# Patient Record
Sex: FEMALE | Race: WHITE | NOT HISPANIC OR LATINO | Employment: OTHER | URBAN - METROPOLITAN AREA
[De-identification: names, ages, dates, MRNs, and addresses within clinical notes are randomized per-mention and may not be internally consistent; named-entity substitution may affect disease eponyms.]

---

## 2017-03-07 PROBLEM — F17.200 SMOKING ADDICTION: Status: ACTIVE | Noted: 2017-03-07

## 2017-04-19 PROBLEM — R53.83 FATIGUE: Status: ACTIVE | Noted: 2017-04-19

## 2017-05-19 PROBLEM — R40.0 SOMNOLENCE, DAYTIME: Status: ACTIVE | Noted: 2017-05-19

## 2017-07-10 PROBLEM — G89.29 CHRONIC PAIN OF RIGHT KNEE: Status: ACTIVE | Noted: 2017-07-10

## 2017-07-10 PROBLEM — M25.561 CHRONIC PAIN OF RIGHT KNEE: Status: ACTIVE | Noted: 2017-07-10

## 2017-08-24 PROBLEM — S22.42XD CLOSED FRACTURE OF MULTIPLE RIBS OF LEFT SIDE WITH ROUTINE HEALING: Status: ACTIVE | Noted: 2017-08-24

## 2017-08-24 PROBLEM — M54.32 LEFT SIDED SCIATICA: Status: ACTIVE | Noted: 2017-08-24

## 2017-08-24 PROBLEM — M25.552 LEFT HIP PAIN: Status: ACTIVE | Noted: 2017-08-24

## 2017-11-06 ENCOUNTER — TELEPHONE (OUTPATIENT)
Dept: PAIN MEDICINE | Facility: CLINIC | Age: 62
End: 2017-11-06

## 2017-11-06 ENCOUNTER — OFFICE VISIT (OUTPATIENT)
Dept: NEUROSURGERY | Facility: CLINIC | Age: 62
End: 2017-11-06
Payer: MEDICARE

## 2017-11-06 VITALS
HEART RATE: 81 BPM | DIASTOLIC BLOOD PRESSURE: 60 MMHG | SYSTOLIC BLOOD PRESSURE: 97 MMHG | BODY MASS INDEX: 26.86 KG/M2 | HEIGHT: 66 IN | WEIGHT: 167.13 LBS

## 2017-11-06 DIAGNOSIS — F20.0 PARANOID SCHIZOPHRENIA: ICD-10-CM

## 2017-11-06 DIAGNOSIS — E11.9 TYPE 2 DIABETES MELLITUS WITHOUT COMPLICATION, WITHOUT LONG-TERM CURRENT USE OF INSULIN: ICD-10-CM

## 2017-11-06 DIAGNOSIS — M25.552 LEFT HIP PAIN: ICD-10-CM

## 2017-11-06 DIAGNOSIS — E78.5 HYPERLIPIDEMIA, UNSPECIFIED HYPERLIPIDEMIA TYPE: ICD-10-CM

## 2017-11-06 DIAGNOSIS — M51.9 LUMBAR DISC DISEASE: Primary | ICD-10-CM

## 2017-11-06 DIAGNOSIS — F32.A ANXIETY AND DEPRESSION: Primary | ICD-10-CM

## 2017-11-06 DIAGNOSIS — J44.9 CHRONIC OBSTRUCTIVE PULMONARY DISEASE, UNSPECIFIED COPD TYPE: ICD-10-CM

## 2017-11-06 DIAGNOSIS — M54.32 LEFT SIDED SCIATICA: ICD-10-CM

## 2017-11-06 DIAGNOSIS — M51.27 LUMBOSACRAL DISC HERNIATION: ICD-10-CM

## 2017-11-06 DIAGNOSIS — F41.9 ANXIETY AND DEPRESSION: Primary | ICD-10-CM

## 2017-11-06 PROCEDURE — 99204 OFFICE O/P NEW MOD 45 MIN: CPT | Mod: S$GLB,,, | Performed by: NEUROLOGICAL SURGERY

## 2017-11-06 NOTE — TELEPHONE ENCOUNTER
----- Message from Theresa Cornejo sent at 11/6/2017  4:14 PM CST -----  Contact: self  Patient is needing a later time appt in the day tomorrow 11/7 Cant make the 8:00am appt wants to know if she can come later in the day     Please call [phone]

## 2017-11-06 NOTE — LETTER
November 6, 2017      Joshua Mcakey MD  [address] [address] [address]           [place] [facility]  [address] [facility] [address] [place] [address]  Phone: [phone]  Fax: [phone]          Patient: Rosenda Hunter   MR Number: [id number]   YOB: 1955   Date of Visit: 11/6/2017       Dear Dr. Joshua Mackey:    Thank you for referring Rosenda Hunter to me for evaluation. Attached you will find relevant portions of my assessment and plan of care.    If you have questions, please do not hesitate to call me. I look forward to following Rosenda Hunter along with you.    Sincerely,    Rafael Garcia MD    Enclosure  CC:  No Recipients    If you would like to receive this communication electronically, please contact [email or web address] or [phone] to request more information on [employer] Link access.    For providers and/or their staff who would like to refer a patient to [facility], please contact us through our one-stop-shop provider referral line, [facility], at [phone].    If you feel you have received this communication in error or would no longer like to receive these types of communications, please e-mail [email or web address]

## 2017-11-06 NOTE — PROGRESS NOTES
Neurosurgery History and Physical    Patient ID: Rosenda Hunter is a 62 y.o. female.    Chief Complaint   Patient presents with    Lumbar Spine Pain (L-Spine)     lower back pain is radiating to hip and through left lower extremity into ankle not into foot, pt states it gets numb and then starts tingling, pt has difficulty standing for long periods, pt states she has to walk leaned forward to relieve the pain, pt states she takes prednisone for the pain, she said she has had an injection but doesn't remember what it was, it was in the ED, no bowel or bladder issues       Review of Systems   Constitutional: Positive for activity change.   HENT: Negative.    Eyes: Negative.    Respiratory: Negative.    Cardiovascular: Negative.    Gastrointestinal: Negative.    Endocrine: Negative.    Genitourinary: Negative.    Musculoskeletal: Positive for back pain and gait problem.   Skin: Positive for rash.   Allergic/Immunologic: Negative.    Neurological: Positive for numbness. Negative for weakness.   Hematological: Negative.    Psychiatric/Behavioral: Negative.        Past Medical History:   Diagnosis Date    Anxiety     Cellulitis of leg     Depression     Fracture, rib     Hyperlipidemia     Hypothyroidism      Social History     Social History    Marital status:      Spouse name: N/A    Number of children: N/A    Years of education: N/A     Occupational History    Not on file.     Social History Main Topics    Smoking status: Current Every Day Smoker    Smokeless tobacco: Never Used    Alcohol use No    Drug use: No    Sexual activity: Not on file     Other Topics Concern    Not on file     Social History Narrative    No narrative on file     Family History   Problem Relation Age of Onset    Diabetes Mother     Hypertension Mother     Hyperlipidemia Mother     Heart disease Mother     Kidney disease Mother      Review of patient's allergies indicates:   Allergen Reactions    Natividad  [cefaclor]        Current Outpatient Prescriptions:     aripiprazole (ABILIFY) 5 MG Tab, Take 5 mg by mouth once daily., Disp: , Rfl:     cilostazol (PLETAL) 100 MG Tab, TAKE 1 TABLET BY MOUTH ONCE DAILY, Disp: 90 tablet, Rfl: 3    COMBIVENT RESPIMAT  mcg/actuation inhaler, INHALE 1 PUFF 3 TIMES DAILY, Disp: 1 Package, Rfl: 11    diazePAM (VALIUM) 5 MG tablet, TAKE 1 TABLET BY MOUTH ONCE DAILY AT BEDTIME AS NEEDED FOR SLEEP, Disp: 15 tablet, Rfl: 2    fluoxetine (PROZAC) 40 MG capsule, Take 40 mg by mouth nightly., Disp: , Rfl: 4    gemfibrozil (LOPID) 600 MG tablet, TAKE 1 TABLET BY MOUTH TWICE DAILY, Disp: 180 tablet, Rfl: 3    levothyroxine (SYNTHROID) 50 MCG tablet, TAKE 1 TABLET BY MOUTH DAILY. TAKE ON EMPTY STOMACH, Disp: 90 tablet, Rfl: 3    LYRICA 75 mg capsule, TAKE 1 CAPSULE BY MOUTH 2 TIMES DAILY, Disp: 180 capsule, Rfl: 1    mupirocin (BACTROBAN) 2 % ointment, Apply to affected area 3 times daily, Disp: 22 g, Rfl: 5    oxycodone-acetaminophen (PERCOCET) 5-325 mg per tablet, TAKE 1 TABLET BY MOUTH EVERY 6 HOURS AS NEEDED FOR RELIEF OF PAIN, Disp: , Rfl: 0    oxycodone-acetaminophen (PERCOCET) 5-325 mg per tablet, Take 1 tablet by mouth every 12 (twelve) hours as needed for Pain., Disp: 45 tablet, Rfl: 0    tizanidine (ZANAFLEX) 4 MG tablet, TAKE 1/2 TABLET BY MOUTH AT BEDTIME AS NEEDED FOR NECK PAIN, Disp: 10 tablet, Rfl: 5    tizanidine (ZANAFLEX) 4 MG tablet, Take 1 tablet (4 mg total) by mouth every 6 (six) hours as needed., Disp: 40 tablet, Rfl: 5    topiramate (TOPAMAX) 50 MG tablet, Take 1 tablet (50 mg total) by mouth 2 (two) times daily., Disp: 60 tablet, Rfl: 11    tramadol (ULTRAM) 50 mg tablet, Take 50 mg by mouth every 6 (six) hours as needed for Pain., Disp: , Rfl:     albuterol (ACCUNEB) 1.25 mg/3 mL Nebu, Take 3 mLs (1.25 mg total) by nebulization 3 (three) times daily as needed., Disp: 40 vial, Rfl: 5    diclofenac (VOLTAREN) 50 MG EC tablet, Take 1 tablet (50 mg  "total) by mouth 2 (two) times daily., Disp: 60 tablet, Rfl: 5    gabapentin (NEURONTIN) 300 MG capsule, Take 1 capsule (300 mg total) by mouth 3 (three) times daily., Disp: 90 capsule, Rfl: 11  Blood pressure 97/60, pulse 81, height 5' 6" (1.676 m), weight 75.8 kg (167 lb 1.7 oz).      Neurologic Exam     Mental Status   Oriented to person, place, and time.   Attention: normal. Concentration: normal.   Speech: speech is normal   Level of consciousness: alert  Knowledge: good.     Cranial Nerves     CN II   Visual acuity: normal    CN III, IV, VI   Pupils are equal, round, and reactive to light.  Extraocular motions are normal.     CN V   Facial sensation intact.     CN VII   Facial expression full, symmetric.     CN VIII   Hearing: intact    CN IX, X   Palate: symmetric    CN XI   CN XI normal.     CN XII   CN XII normal.     Motor Exam   Muscle bulk: normal  Overall muscle tone: normal  Right arm pronator drift: absent  Left arm pronator drift: absent    Strength   Right neck flexion: 5/5  Left neck flexion: 5/5  Right neck extension: 5/5  Left neck extension: 5/5  Right deltoid: 5/5  Left deltoid: 5/5  Right biceps: 5/5  Left biceps: 5/5  Right triceps: 5/5  Left triceps: 5/5  Right wrist flexion: 5/5  Left wrist flexion: 5/5  Right wrist extension: 5/5  Left wrist extension: 5/5  Right interossei: 5/5  Left interossei: 5/5  Right abdominals: 5/5  Left abdominals: 5/5  Right iliopsoas: 5/5  Left iliopsoas: 5/5  Right quadriceps: 5/5  Left quadriceps: 5/5  Right hamstrin/5  Left hamstrin/5  Right glutei: 5/5  Left glutei: 5/5  Right anterior tibial: 5/5  Left anterior tibial: 5/5  Right posterior tibial: 5/5  Left posterior tibial: 5/5  Right peroneal: 5/5  Left peroneal: 5/5  Right gastroc: 5/5  Left gastroc: 5/5    Sensory Exam   Light touch normal.     Gait, Coordination, and Reflexes     Gait  Gait: normal    Coordination   Romberg: negative  Finger to nose coordination: normal    Tremor   Resting " "tremor: absent    Reflexes   Right brachioradialis: 2+  Left brachioradialis: 2+  Right biceps: 2+  Left biceps: 2+  Right triceps: 2+  Left triceps: 2+  Right patellar: 2+  Left patellar: 2+  Right achilles: 1+  Left achilles: 1+  Right plantar: normal  Left plantar: normal  Right Beard: absent  Left Beard: absent  Right ankle clonus: absent  Left ankle clonus: absent      Physical Exam   Constitutional: She is oriented to person, place, and time. She appears well-developed and well-nourished.   unkept   HENT:   Head: Normocephalic and atraumatic.   Eyes: EOM are normal. Pupils are equal, round, and reactive to light.   Neck: Normal range of motion. Neck supple.   Cardiovascular: Normal rate and regular rhythm.    Pulmonary/Chest: Effort normal.   Abdominal: Soft.   Musculoskeletal: Normal range of motion.   Neurological: She is alert and oriented to person, place, and time. She has a normal Finger-Nose-Finger Test and a normal Romberg Test. Gait normal.   Reflex Scores:       Tricep reflexes are 2+ on the right side and 2+ on the left side.       Bicep reflexes are 2+ on the right side and 2+ on the left side.       Brachioradialis reflexes are 2+ on the right side and 2+ on the left side.       Patellar reflexes are 2+ on the right side and 2+ on the left side.       Achilles reflexes are 1+ on the right side and 1+ on the left side.  Skin: Skin is warm and dry.   Multiple skin sores/excoriations of various ages   Psychiatric: Her speech is normal.   Odd affect   Nursing note and vitals reviewed.      Vital Signs  Pulse: 81  BP: 97/60  Pain Score:   9 (and leg radiating from back)  Pain Loc: Hip  Height and Weight  Height: 5' 6" (167.6 cm)  Weight: 75.8 kg (167 lb 1.7 oz)  BSA (Calculated - sq m): 1.88 sq meters  BMI (Calculated): 27  Weight in (lb) to have BMI = 25: 154.6]    Provider dictation:  I reviewed the imaging. She has a left L5-S1 disc herniation with an extruded fragment in the left L5-S1 foramen " resulting in L5 nerve compression. This is most likely the source of the left leg posterior leg pain she has been having since a fall 4-5 months ago. On exam, she has no weakness. We will refer her for a left L5-S1 CELI and have her return to see Megan Melendrez after the injection. If she does not get good relief, she can see me again to disc a simple decompression.      Visit Diagnosis:  Anxiety and depression    Chronic obstructive pulmonary disease, unspecified COPD type    Hyperlipidemia, unspecified hyperlipidemia type    Paranoid schizophrenia    Type 2 diabetes mellitus without complication, without long-term current use of insulin    Left hip pain    Left sided sciatica    Lumbosacral disc herniation

## 2017-11-07 ENCOUNTER — OFFICE VISIT (OUTPATIENT)
Dept: PAIN MEDICINE | Facility: CLINIC | Age: 62
End: 2017-11-07
Payer: MEDICARE

## 2017-11-07 VITALS
WEIGHT: 167.56 LBS | DIASTOLIC BLOOD PRESSURE: 68 MMHG | RESPIRATION RATE: 20 BRPM | HEART RATE: 87 BPM | HEIGHT: 66 IN | SYSTOLIC BLOOD PRESSURE: 101 MMHG | BODY MASS INDEX: 26.93 KG/M2

## 2017-11-07 DIAGNOSIS — M51.27 LUMBOSACRAL DISC HERNIATION: Primary | ICD-10-CM

## 2017-11-07 DIAGNOSIS — M54.16 LUMBAR RADICULOPATHY: ICD-10-CM

## 2017-11-07 DIAGNOSIS — F41.9 ANXIETY AND DEPRESSION: ICD-10-CM

## 2017-11-07 DIAGNOSIS — F32.A ANXIETY AND DEPRESSION: ICD-10-CM

## 2017-11-07 DIAGNOSIS — M51.36 ANNULAR TEAR OF LUMBAR DISC: ICD-10-CM

## 2017-11-07 PROCEDURE — 99999 PR PBB SHADOW E&M-EST. PATIENT-LVL III: CPT | Mod: PBBFAC,,, | Performed by: PAIN MEDICINE

## 2017-11-07 PROCEDURE — 99204 OFFICE O/P NEW MOD 45 MIN: CPT | Mod: S$GLB,,, | Performed by: PAIN MEDICINE

## 2017-11-07 RX ORDER — DIAZEPAM 10 MG/1
TABLET ORAL
Refills: 0 | COMMUNITY
Start: 2017-10-11 | End: 2017-11-16

## 2017-11-07 RX ORDER — IBUPROFEN 800 MG/1
TABLET ORAL
Refills: 0 | COMMUNITY
Start: 2017-08-23 | End: 2018-01-31

## 2017-11-07 RX ORDER — FLUOXETINE HYDROCHLORIDE 20 MG/1
CAPSULE ORAL
Refills: 1 | COMMUNITY
Start: 2017-10-04 | End: 2021-03-09

## 2017-11-07 NOTE — LETTER
November 7, 2017      Rafael Garcia MD  [address] [facility] [address] [place] [address]           [place] [facility]  [address] [facility] [address] [place] [address]  Phone: [phone]          Patient: Rosenda Hunter   MR Number: [id number]   YOB: 1955   Date of Visit: 11/7/2017       Dear Dr. Rfaael Garcia:    Thank you for referring Rosenda Hunter to me for evaluation. Attached you will find relevant portions of my assessment and plan of care.    If you have questions, please do not hesitate to call me. I look forward to following Rosenda Hunter along with you.    Sincerely,    Robert Gaspar Jr., MD    Enclosure  CC:  No Recipients    If you would like to receive this communication electronically, please contact [email or web address] or [phone] to request more information on [employer] Link access.    For providers and/or their staff who would like to refer a patient to [facility], please contact us through our one-stop-shop provider referral line, [facility], at [phone].    If you feel you have received this communication in error or would no longer like to receive these types of communications, please e-mail [email or web address]

## 2017-11-07 NOTE — PROGRESS NOTES
[facility] New Patient Evaluation    Referred by: Dr. Gonzalez  Reason for referral: Left LBP with Radiculopathy    CC:   Chief Complaint   Patient presents with    Low-back Pain    Tailbone Pain       HPI: Rosenda Hunter is a pleasant 62 y.o. female who presented to my clinic complaining of low back pain radiating into the left leg as characterized below.  She is accompanied by her mother.    Location: Low back and left leg  Severity: Currently: 8/10   Typical Range: 4-8/10     Exacerbation: 10/10  Onset: Early summer of 2017 associated with fall  Quality: Aching, Throbbing and Shooting  Radiation: Left posterior thigh and leg, left lateral thigh and leg down to ankle.  Axial/Extremity Percentage of Pain: 30/70  Exacerbating Factors: extension and lying down  Mitigating Factors: rest  Assoc: denies night fever/night sweats, urinary incontinence, bowel incontinence, significant weight loss, significant motor weakness and loss of sensations    Previous Therapies:  PT: Denies  OT:   HEP:   TENS:  Injections: Denies  Surgery: Denies  Other:   Medications: Denies previous medications other than what is listed below.  She has been on Lyrica for quite a while.    Current Pain Medications:  1. Tramadol 50 every 6 when necessary  2. Tizanidine 2 mg daily at bedtime  3. Gabapentin 300 3 times a day  4. Lyrica 75 twice a day     Full Medication List:    Current Outpatient Prescriptions:     aripiprazole (ABILIFY) 5 MG Tab, Take 5 mg by mouth once daily., Disp: , Rfl:     cilostazol (PLETAL) 100 MG Tab, TAKE 1 TABLET BY MOUTH ONCE DAILY, Disp: 90 tablet, Rfl: 3    COMBIVENT RESPIMAT  mcg/actuation inhaler, INHALE 1 PUFF 3 TIMES DAILY, Disp: 1 Package, Rfl: 11    diazePAM (VALIUM) 10 MG Tab, Take 1 tablet (10 mg total) by mouth once., Disp: , Rfl: 0    diclofenac (VOLTAREN) 50 MG EC tablet, Take 1 tablet (50 mg total) by mouth 2 (two) times daily., Disp: 60 tablet, Rfl: 5    FLUoxetine  (PROZAC) 20 MG capsule, Take 1 capsule by mouth every night take with 40mg dose for total of 60mg, Disp: , Rfl: 1    gabapentin (NEURONTIN) 300 MG capsule, Take 1 capsule (300 mg total) by mouth 3 (three) times daily., Disp: 90 capsule, Rfl: 11    gemfibrozil (LOPID) 600 MG tablet, TAKE 1 TABLET BY MOUTH TWICE DAILY, Disp: 180 tablet, Rfl: 3    ibuprofen (ADVIL,MOTRIN) 800 MG tablet, TAKE 1 TABLET BY MOUTH EVERY 8 HOURS AS NEEDED FOR RELIEF OF PAIN, Disp: , Rfl: 0    levothyroxine (SYNTHROID) 50 MCG tablet, TAKE 1 TABLET BY MOUTH DAILY. TAKE ON EMPTY STOMACH, Disp: 90 tablet, Rfl: 3    LYRICA 75 mg capsule, TAKE 1 CAPSULE BY MOUTH 2 TIMES DAILY, Disp: 180 capsule, Rfl: 1    mupirocin (BACTROBAN) 2 % ointment, Apply to affected area 3 times daily, Disp: 22 g, Rfl: 5    oxycodone-acetaminophen (PERCOCET) 5-325 mg per tablet, Take 1 tablet by mouth every 12 (twelve) hours as needed for Pain., Disp: 45 tablet, Rfl: 0    tizanidine (ZANAFLEX) 4 MG tablet, Take 1 tablet (4 mg total) by mouth every 6 (six) hours as needed., Disp: 40 tablet, Rfl: 5    topiramate (TOPAMAX) 50 MG tablet, Take 1 tablet (50 mg total) by mouth 2 (two) times daily., Disp: 60 tablet, Rfl: 11    albuterol (ACCUNEB) 1.25 mg/3 mL Nebu, Take 3 mLs (1.25 mg total) by nebulization 3 (three) times daily as needed., Disp: 40 vial, Rfl: 5    oxycodone-acetaminophen (PERCOCET) 5-325 mg per tablet, TAKE 1 TABLET BY MOUTH EVERY 6 HOURS AS NEEDED FOR RELIEF OF PAIN, Disp: , Rfl: 0    tizanidine (ZANAFLEX) 4 MG tablet, TAKE 1/2 TABLET BY MOUTH AT BEDTIME AS NEEDED FOR NECK PAIN, Disp: 10 tablet, Rfl: 5    tramadol (ULTRAM) 50 mg tablet, Take 50 mg by mouth every 6 (six) hours as needed for Pain., Disp: , Rfl:      Review of Systems:  Review of Systems   Constitutional: Negative for chills and fever.   HENT: Negative for nosebleeds.    Eyes: Negative for pain.   Respiratory: Negative for hemoptysis.    Cardiovascular: Negative for chest pain.  "  Gastrointestinal: Negative for nausea and vomiting.   Genitourinary: Negative for dysuria.   Musculoskeletal: Positive for back pain, falls and joint pain.   Skin: Negative for rash.   Neurological: Negative for seizures.   Endo/Heme/Allergies: Bruises/bleeds easily.   Psychiatric/Behavioral: Negative for hallucinations.       Allergies:  Ceclor [cefaclor]     Medical History:  Past Medical History:   Diagnosis Date    Anxiety     Cellulitis of leg     Depression     Fracture, rib     Hyperlipidemia     Hypothyroidism         Surgical History:  Past Surgical History:   Procedure Laterality Date    DENTAL SURGERY          Social History:  Social History     Social History    Marital status:      Spouse name: N/A    Number of children: N/A    Years of education: N/A     Occupational History    Not on file.     Social History Main Topics    Smoking status: Current Every Day Smoker    Smokeless tobacco: Never Used    Alcohol use No    Drug use: No    Sexual activity: Not on file     Other Topics Concern    Not on file     Social History Narrative    No narrative on file       Physical Exam:  Vitals:    11/07/17 0824   BP: 101/68   Pulse: 87   Resp: 20   Weight: 76 kg (167 lb 8.8 oz)   Height: 5' 6" (1.676 m)   PainSc:   8   PainLoc: Back     General    Nursing note and vitals reviewed.  Constitutional: She is oriented to person, place, and time. She appears well-developed and well-nourished. No distress.   HENT:   Head: Atraumatic.   Eyes: EOM are normal. Right eye exhibits no discharge. Left eye exhibits no discharge.   Neck: No JVD present.   Cardiovascular: Intact distal pulses.    Pulmonary/Chest: Effort normal. No respiratory distress.   Abdominal: She exhibits no distension.   Neurological: She is alert and oriented to person, place, and time.   Psychiatric: Judgment and thought content normal.     General Musculoskeletal Exam   Gait: antalgic     Back (L-Spine & T-Spine) / Neck " (C-Spine) Exam     Tenderness Left paramedian tenderness of the Lower L-Spine and Upper L-Spine.     Back (L-Spine & T-Spine) Range of Motion   Lateral Bend Right: abnormal   Lateral Bend Left: abnormal   Rotation Right: abnormal   Rotation Left: abnormal     Spinal Sensation   Right Side Sensation  L-Spine Level: normal  Left Side Sensation  L-Spine Level: normal    Comments:  Straight leg raise positive on left.      Muscle Strength   Right Lower Extremity   Hip Flexion: 5/5   Quadriceps:  5/5   Hamstrin/5   Anterior tibial:  5/5/5  Gastrocsoleus:  5//5  Left Lower Extremity   Hip Flexion: 5/5   Quadriceps:  5/5   Hamstrin/5   Anterior tibial:  /5   Gastrocsoleus:  /    Vascular Exam     Right Pulses  Dorsalis Pedis:      2+  Posterior Tibial:      2+        Left Pulses  Dorsalis Pedis:      2+  Posterior Tibial:      2+            Imaging:  MRI lumbar spine 10/20/17  By review of the images in this study shows multilevel degenerative disc disease most prominent at L3-4, L4-5, and L5-S1.  There is posterior bulging of discs at these levels resulting in varying levels of neuroforaminal stenosis which is most pronounced at left L5-S1.  There is also neural foraminal stenosis to a lesser degree at left L4-5.  There appears to be a T2 hyperintensity within the disc bulge suggestive of annular tear.  There is also multilevel moderate facet disease most prominent at L4-5 and L5-S1 bilaterally.      Labs:  BMP  Lab Results   Component Value Date     2017    K 3.6 2017     2017    CO2 25 2017    BUN 18 2017    CREATININE 0.73 2017    CALCIUM 9.7 2017    ANIONGAP 8 2017    ESTGFRAFRICA >60 2017    EGFRNONAA >60 2017     Lab Results   Component Value Date    ALT 41 2017    AST 19 2017    ALKPHOS 109 2017    BILITOT 0.5 2017       Diagnoses & Associated Orders:  Problem List Items Addressed This Visit     Anxiety  and depression    Lumbosacral disc herniation - Primary    Annular tear of lumbar disc    Lumbar radiculopathy        This is a 62-year-old female with multilevel degenerative disc disease leading to symptomatic neuroforaminal stenosis at left L4-5 and L5-S1 currently on a combination of pharmacological therapies including NSAIDs, anticonvulsants, and opioids.    Recommendations & Interventions:   Procedures: Left L4-5, L5-S1 TF CELI ×1 then return to clinic for evaluation  Medications: No medication changes recommended at this time as she is already on several neuropathic medicines.  Due to her history of depression, anxiety, and paranoid schizophrenia she is a poor candidate for chronic opioid therapy.  PT/OT: I plan to refer her to physical therapy after completion of the intervention.  I do not believe she would be able to participate meaningfully at this time due to pain.  HEP: She would benefit from institution of a home exercise program based on physical therapy training which we will discuss in subsequent visits.  Follow Up: Return to clinic 2-4 weeks after TF CELI.    Robert Gaspar Jr, MD  Interventional Pain Medicine / Anesthesiology      **This note was partly generated using dictation software which may occasionally result in transcription errors.**

## 2017-11-17 DIAGNOSIS — M54.16 LUMBAR RADICULOPATHY: Primary | ICD-10-CM

## 2017-11-17 RX ORDER — ALPRAZOLAM 0.5 MG/1
1 TABLET, ORALLY DISINTEGRATING ORAL ONCE AS NEEDED
Status: CANCELLED | OUTPATIENT
Start: 2017-11-17 | End: 2017-11-17

## 2017-11-20 DIAGNOSIS — M51.36 DDD (DEGENERATIVE DISC DISEASE), LUMBAR: Primary | ICD-10-CM

## 2017-11-21 ENCOUNTER — HOSPITAL ENCOUNTER (OUTPATIENT)
Facility: HOSPITAL | Age: 62
Discharge: HOME | End: 2017-11-21
Attending: PAIN MEDICINE | Admitting: PAIN MEDICINE
Payer: MEDICARE

## 2017-11-21 ENCOUNTER — SURGERY (OUTPATIENT)
Age: 62
End: 2017-11-21

## 2017-11-21 ENCOUNTER — HOSPITAL ENCOUNTER (OUTPATIENT)
Dept: RADIOLOGY | Facility: HOSPITAL | Age: 62
Discharge: HOME | End: 2017-11-21
Attending: PAIN MEDICINE | Admitting: PAIN MEDICINE
Payer: MEDICARE

## 2017-11-21 VITALS
DIASTOLIC BLOOD PRESSURE: 59 MMHG | HEART RATE: 66 BPM | TEMPERATURE: 98 F | SYSTOLIC BLOOD PRESSURE: 116 MMHG | RESPIRATION RATE: 16 BRPM | OXYGEN SATURATION: 97 %

## 2017-11-21 DIAGNOSIS — M51.36 DDD (DEGENERATIVE DISC DISEASE), LUMBAR: ICD-10-CM

## 2017-11-21 DIAGNOSIS — M54.16 LUMBAR RADICULOPATHY: Primary | ICD-10-CM

## 2017-11-21 PROCEDURE — 64483 NJX AA&/STRD TFRM EPI L/S 1: CPT | Mod: LT,,, | Performed by: PAIN MEDICINE

## 2017-11-21 PROCEDURE — 25500020 PHARM REV CODE 255: Mod: PO | Performed by: PAIN MEDICINE

## 2017-11-21 PROCEDURE — 25000003 PHARM REV CODE 250: Mod: PO | Performed by: PAIN MEDICINE

## 2017-11-21 PROCEDURE — 64483 NJX AA&/STRD TFRM EPI L/S 1: CPT | Mod: PO | Performed by: PAIN MEDICINE

## 2017-11-21 PROCEDURE — 64484 NJX AA&/STRD TFRM EPI L/S EA: CPT | Mod: PO | Performed by: PAIN MEDICINE

## 2017-11-21 PROCEDURE — 76000 FLUOROSCOPY <1 HR PHYS/QHP: CPT | Mod: TC,PO

## 2017-11-21 PROCEDURE — 63600175 PHARM REV CODE 636 W HCPCS: Mod: PO | Performed by: PAIN MEDICINE

## 2017-11-21 RX ORDER — BUPIVACAINE HYDROCHLORIDE 2.5 MG/ML
INJECTION, SOLUTION EPIDURAL; INFILTRATION; INTRACAUDAL
Status: DISCONTINUED | OUTPATIENT
Start: 2017-11-21 | End: 2017-11-21 | Stop reason: HOSPADM

## 2017-11-21 RX ORDER — DEXAMETHASONE SODIUM PHOSPHATE 4 MG/ML
INJECTION, SOLUTION INTRA-ARTICULAR; INTRALESIONAL; INTRAMUSCULAR; INTRAVENOUS; SOFT TISSUE
Status: DISCONTINUED | OUTPATIENT
Start: 2017-11-21 | End: 2017-11-21 | Stop reason: HOSPADM

## 2017-11-21 RX ORDER — LIDOCAINE HYDROCHLORIDE 10 MG/ML
INJECTION, SOLUTION EPIDURAL; INFILTRATION; INTRACAUDAL; PERINEURAL
Status: DISCONTINUED | OUTPATIENT
Start: 2017-11-21 | End: 2017-11-21 | Stop reason: HOSPADM

## 2017-11-21 RX ORDER — ALPRAZOLAM 0.5 MG/1
1 TABLET, ORALLY DISINTEGRATING ORAL ONCE AS NEEDED
Status: COMPLETED | OUTPATIENT
Start: 2017-11-21 | End: 2017-11-21

## 2017-11-21 RX ADMIN — ALPRAZOLAM 1 MG: 0.5 TABLET, ORALLY DISINTEGRATING ORAL at 01:11

## 2017-11-21 RX ADMIN — LIDOCAINE HYDROCHLORIDE 5 ML: 10 INJECTION, SOLUTION EPIDURAL; INFILTRATION; INTRACAUDAL; PERINEURAL at 01:11

## 2017-11-21 RX ADMIN — DEXAMETHASONE SODIUM PHOSPHATE 20 MG: 4 INJECTION, SOLUTION INTRAMUSCULAR; INTRAVENOUS at 01:11

## 2017-11-21 RX ADMIN — BUPIVACAINE HYDROCHLORIDE 5 ML: 2.5 INJECTION, SOLUTION EPIDURAL; INFILTRATION; INTRACAUDAL; PERINEURAL at 01:11

## 2017-11-21 RX ADMIN — IOHEXOL 3 ML: 300 INJECTION, SOLUTION INTRAVENOUS at 01:11

## 2017-11-21 NOTE — OP NOTE
"Procedure Note    Pre-operative Diagnosis: Lumbar radiculopathy  Post-operative Diagnosis: Lumbar radiculopathy  Procedure: (1) Lumbar Transforaminal Epidural Steroid Injection and (2) Intraoperative fluoroscopy  Procedure Date: 11/21/2017        Anesthesia: Oral Sedation with Alprazolam    Indications: To alleviate pain and suffering, and reduce functional impairment associated with Lumbar radiculopathy.      The patients history and physical exam were reviewed. The risks, benefits and alternatives to the procedure were discussed, and all questions were answered to the patients satisfaction. The patient agreed to proceed, and written informed consent was verified.    Procedure in Detail: Using a fenestrated drape and Chloro-prep, the skin was prepared and draped in sterile fashion. The Left L4-5 and L5-S1 neural foramena were identified by fluoroscopy by Left lateral oblique angle. Lidocaine 1% 3 cc was used to anesthetize the skin at the skin entry point and subcutaneous tissue with 25G 1 1/2 in needle. Then a 25G 3.5" spinal needle was advanced under intermittent fluoroscopy until Kambin's triangle was entered anterolateral to the superior articular process. Fluoroscopy was then inspected from lateral and AP view and needle adjusted appropriately. There was no paresthesia with needle placement. Aspiration was negative for blood and CSF. Omnipaque contrast was injected live at each level in an AP fluoroscopic view demonstrating appropriate position with spread into the nerve root sheath and medially into the epidural space without intravascular or intrathecal spread. Next,a mixture 1 mL Bupivacaine 0.25% and 20 mg dexamethasone (total 3 mL) was divided evenly between the levels and injected slowly and incrementally. The patient tolerated the procedure without complaint and was transported to the recovery room in stable condition.     Disposition: The patient tolerated the procedure well, and there were no " apparent complications. Vital signs remained stable throughout the procedure. The patient was taken to the recovery area where written discharge instructions for the procedure were given.     Follow-up: RTC as scheduled    Robert Gaspar Jr, MD  Interventional Pain Medicine / Anesthesiology

## 2017-11-21 NOTE — DISCHARGE SUMMARY
[facility]  Discharge Note  Short Stay    Admit Date: 11/21/2017    Discharge Date and Time: No discharge date for patient encounter.     Attending Physician: Robert Gaspar Jr., MD     Discharge Provider: Robert Gaspar Jr    Diagnoses:  Active Hospital Problems    Diagnosis  POA    *Lumbar radiculopathy [M54.16]  Yes      Resolved Hospital Problems    Diagnosis Date Resolved POA   No resolved problems to display.       Discharged Condition: stable    Hospital Course: Patient was admitted for an outpatient procedure and tolerated the procedure well with no complications.    Final Diagnoses: Same as principal problem.    Disposition: Home or Self Care    Follow up/Patient Instructions:    Medications:  Reconciled Home Medications:   Current Discharge Medication List      CONTINUE these medications which have NOT CHANGED    Details   albuterol (ACCUNEB) 1.25 mg/3 mL Nebu Take 3 mLs (1.25 mg total) by nebulization 3 (three) times daily as needed.  Qty: 40 vial, Refills: 5    Comments: J44.9  Associated Diagnoses: Sinusitis, unspecified chronicity, unspecified location; Bronchitis with bronchospasm      aripiprazole (ABILIFY) 5 MG Tab Take 5 mg by mouth once daily.      cilostazol (PLETAL) 100 MG Tab TAKE 1 TABLET BY MOUTH ONCE DAILY  Qty: 90 tablet, Refills: 3    Comments: This prescription was filled today(11/7/2016). Any refills authorized will be placed on file.      COMBIVENT RESPIMAT  mcg/actuation inhaler INHALE 1 PUFF 3 TIMES DAILY  Qty: 1 Package, Refills: 11      diazePAM (VALIUM) 5 MG tablet TAKE 1 TABLET BY MOUTH ONCE DAILY AT BEDTIME AS NEEDED FOR SLEEP  Qty: 15 tablet, Refills: 2      FLUoxetine (PROZAC) 20 MG capsule Take 1 capsule by mouth every night  Refills: 1      gemfibrozil (LOPID) 600 MG tablet TAKE 1 TABLET BY MOUTH TWICE DAILY  Qty: 180 tablet, Refills: 3      ibuprofen (ADVIL,MOTRIN) 800 MG tablet TAKE 1 TABLET BY MOUTH EVERY 8 HOURS AS NEEDED FOR RELIEF OF  PAIN  Refills: 0      levothyroxine (SYNTHROID) 50 MCG tablet TAKE 1 TABLET BY MOUTH DAILY. TAKE ON EMPTY STOMACH  Qty: 90 tablet, Refills: 3      LYRICA 75 mg capsule TAKE 1 CAPSULE BY MOUTH 2 TIMES DAILY  Qty: 180 capsule, Refills: 1      mupirocin (BACTROBAN) 2 % ointment Apply to affected area 3 times daily  Qty: 22 g, Refills: 5      oxycodone-acetaminophen (PERCOCET) 5-325 mg per tablet TAKE 1 TABLET BY MOUTH EVERY 6 HOURS AS NEEDED FOR RELIEF OF PAIN  Refills: 0      !! tizanidine (ZANAFLEX) 4 MG tablet TAKE 1/2 TABLET BY MOUTH AT BEDTIME AS NEEDED FOR NECK PAIN  Qty: 10 tablet, Refills: 5      !! tizanidine (ZANAFLEX) 4 MG tablet Take 1 tablet (4 mg total) by mouth every 6 (six) hours as needed.  Qty: 40 tablet, Refills: 5      topiramate (TOPAMAX) 50 MG tablet Take 1 tablet (50 mg total) by mouth 2 (two) times daily.  Qty: 60 tablet, Refills: 11      tramadol (ULTRAM) 50 mg tablet Take 50 mg by mouth every 6 (six) hours as needed for Pain.       !! - Potential duplicate medications found. Please discuss with provider.          Discharge Procedure Orders  Diet general     Activity as tolerated     Call MD for:  temperature >100.4     Call MD for:  severe uncontrolled pain     Call MD for:  redness, tenderness, or signs of infection (pain, swelling, redness, odor or green/yellow discharge around incision site)     Call MD for:  difficulty breathing or increased cough     Call MD for:  severe persistent headache     Call MD for:  worsening rash     Remove dressing in 24 hours           Discharge Procedure Orders (must include Diet, Follow-up, Activity):    Discharge Procedure Orders (must include Diet, Follow-up, Activity)  Diet general     Activity as tolerated     Call MD for:  temperature >100.4     Call MD for:  severe uncontrolled pain     Call MD for:  redness, tenderness, or signs of infection (pain, swelling, redness, odor or green/yellow discharge around incision site)     Call MD for:  difficulty  breathing or increased cough     Call MD for:  severe persistent headache     Call MD for:  worsening rash     Remove dressing in 24 hours

## 2017-12-19 ENCOUNTER — OFFICE VISIT (OUTPATIENT)
Dept: PAIN MEDICINE | Facility: CLINIC | Age: 62
End: 2017-12-19
Payer: MEDICARE

## 2017-12-19 VITALS
WEIGHT: 171.94 LBS | DIASTOLIC BLOOD PRESSURE: 58 MMHG | BODY MASS INDEX: 27.63 KG/M2 | SYSTOLIC BLOOD PRESSURE: 108 MMHG | HEART RATE: 80 BPM | RESPIRATION RATE: 20 BRPM | TEMPERATURE: 97 F | HEIGHT: 66 IN

## 2017-12-19 DIAGNOSIS — M51.27 LUMBOSACRAL DISC HERNIATION: Primary | ICD-10-CM

## 2017-12-19 DIAGNOSIS — M47.816 LUMBAR SPONDYLOSIS: ICD-10-CM

## 2017-12-19 DIAGNOSIS — M54.16 LUMBAR RADICULOPATHY: ICD-10-CM

## 2017-12-19 DIAGNOSIS — M51.36 ANNULAR TEAR OF LUMBAR DISC: ICD-10-CM

## 2017-12-19 DIAGNOSIS — M79.18 MYOFASCIAL PAIN: ICD-10-CM

## 2017-12-19 PROCEDURE — 99214 OFFICE O/P EST MOD 30 MIN: CPT | Mod: S$GLB,,, | Performed by: PAIN MEDICINE

## 2017-12-19 PROCEDURE — 99999 PR PBB SHADOW E&M-EST. PATIENT-LVL III: CPT | Mod: PBBFAC,,, | Performed by: PAIN MEDICINE

## 2017-12-19 NOTE — PROGRESS NOTES
[facility] New Patient Evaluation    Referred by: Dr. Gonzalez  Reason for referral: Left LBP with Radiculopathy    CC:   Chief Complaint   Patient presents with    Low-back Pain     Interval Update:  #1 - 12/19/17 Overall she is doing a lot better with much less pain and limitation in function.  She can stand and walking longer without pain down the legs.  Her pain is rated 2/10 and non-radiating; not worse than 3/10 in the last week; no need for pain medications since the injection.     Background: Rosenda Hunter is a pleasant 62 y.o. female who presented to my clinic complaining of low back pain radiating into the left leg as characterized below.  She is accompanied by her mother.    Location: Low back and left leg  Severity: Currently: 8/10   Typical Range: 4-8/10     Exacerbation: 10/10  Onset: Early summer of 2017 associated with fall  Quality: Aching, Throbbing and Shooting  Radiation: Left posterior thigh and leg, left lateral thigh and leg down to ankle.  Axial/Extremity Percentage of Pain: 30/70  Exacerbating Factors: extension and lying down  Mitigating Factors: rest  Assoc: denies night fever/night sweats, urinary incontinence, bowel incontinence, significant weight loss, significant motor weakness and loss of sensations    Previous Therapies:  PT: Denies  OT:   HEP:   TENS:  Injections: Denies  Surgery: Denies  Other:   Medications: Denies previous medications other than what is listed below.  She has been on Lyrica for quite a while.    Current Pain Medications:  1. Tramadol 50 every 6 when necessary  2. Tizanidine 2 mg daily at bedtime  3. Gabapentin 300 3 times a day  4. Lyrica 75 twice a day     Full Medication List:    Current Outpatient Prescriptions:     albuterol (ACCUNEB) 1.25 mg/3 mL Nebu, Take 3 mLs (1.25 mg total) by nebulization 3 (three) times daily as needed., Disp: 40 vial, Rfl: 5    aripiprazole (ABILIFY) 5 MG Tab, Take 5 mg by mouth once daily., Disp: , Rfl:      cilostazol (PLETAL) 100 MG Tab, TAKE 1 TABLET BY MOUTH ONCE DAILY, Disp: 90 tablet, Rfl: 3    COMBIVENT RESPIMAT  mcg/actuation inhaler, INHALE 1 PUFF 3 TIMES DAILY, Disp: 1 Package, Rfl: 11    FLUoxetine (PROZAC) 20 MG capsule, Take 1 capsule by mouth every night, Disp: , Rfl: 1    gemfibrozil (LOPID) 600 MG tablet, TAKE 1 TABLET BY MOUTH TWICE DAILY, Disp: 180 tablet, Rfl: 3    levothyroxine (SYNTHROID) 50 MCG tablet, TAKE 1 TABLET BY MOUTH DAILY. TAKE ON EMPTY STOMACH, Disp: 90 tablet, Rfl: 3    LYRICA 75 mg capsule, TAKE 1 CAPSULE BY MOUTH 2 TIMES DAILY (Patient taking differently: TAKE 2 CAPSULE BY MOUTH NIGHTLY), Disp: 180 capsule, Rfl: 1    topiramate (TOPAMAX) 50 MG tablet, Take 1 tablet (50 mg total) by mouth 2 (two) times daily. (Patient taking differently: Take 100 mg by mouth 2 (two) times daily. ), Disp: 60 tablet, Rfl: 11    diazePAM (VALIUM) 5 MG tablet, TAKE 1 TABLET BY MOUTH ONCE DAILY AT BEDTIME AS NEEDED FOR SLEEP, Disp: 15 tablet, Rfl: 2    ibuprofen (ADVIL,MOTRIN) 800 MG tablet, TAKE 1 TABLET BY MOUTH EVERY 8 HOURS AS NEEDED FOR RELIEF OF PAIN, Disp: , Rfl: 0    mupirocin (BACTROBAN) 2 % ointment, Apply to affected area 3 times daily (Patient taking differently: Apply to affected area 3 times daily as needed), Disp: 22 g, Rfl: 5    oxycodone-acetaminophen (PERCOCET) 5-325 mg per tablet, TAKE 1 TABLET BY MOUTH EVERY 6 HOURS AS NEEDED FOR RELIEF OF PAIN, Disp: , Rfl: 0    tizanidine (ZANAFLEX) 4 MG tablet, TAKE 1/2 TABLET BY MOUTH AT BEDTIME AS NEEDED FOR NECK PAIN, Disp: 10 tablet, Rfl: 5    tizanidine (ZANAFLEX) 4 MG tablet, Take 1 tablet (4 mg total) by mouth every 6 (six) hours as needed., Disp: 40 tablet, Rfl: 5    tramadol (ULTRAM) 50 mg tablet, Take 50 mg by mouth every 6 (six) hours as needed for Pain., Disp: , Rfl:      Review of Systems:  Review of Systems   Constitutional: Negative for chills and fever.   HENT: Negative for nosebleeds.    Eyes: Negative for pain.  "  Respiratory: Negative for hemoptysis.    Cardiovascular: Negative for chest pain.   Gastrointestinal: Negative for nausea and vomiting.   Genitourinary: Negative for dysuria.   Musculoskeletal: Positive for back pain, falls and joint pain.   Skin: Negative for rash.   Neurological: Negative for seizures.   Endo/Heme/Allergies: Bruises/bleeds easily.   Psychiatric/Behavioral: Negative for hallucinations.       Allergies:  Ceclor [cefaclor]     Medical History:  Past Medical History:   Diagnosis Date    Anxiety     Arthritis     Cellulitis of leg     COPD (chronic obstructive pulmonary disease)     Depression     Diabetes mellitus     as an adult, corrected with diet    DVT (deep venous thrombosis)     Fracture, rib     Hyperlipidemia     Hypertension     Hypothyroidism     Lumbar disc disease         Surgical History:  Past Surgical History:   Procedure Laterality Date    DENTAL SURGERY      hien          Social History:  Social History     Social History    Marital status:      Spouse name: N/A    Number of children: N/A    Years of education: N/A     Occupational History    Not on file.     Social History Main Topics    Smoking status: Current Every Day Smoker     Packs/day: 1.00    Smokeless tobacco: Never Used    Alcohol use No    Drug use: No    Sexual activity: Yes     Partners: Male     Other Topics Concern    Not on file     Social History Narrative    No narrative on file       Physical Exam:  Vitals:    12/19/17 1006   BP: (!) 108/58   Pulse: 80   Resp: 20   Temp: 97.3 °F (36.3 °C)   Weight: 78 kg (171 lb 15.3 oz)   Height: 5' 6" (1.676 m)   PainSc:   3   PainLoc: Back     General    Nursing note and vitals reviewed.  Constitutional: She is oriented to person, place, and time. She appears well-developed and well-nourished. No distress.   HENT:   Head: Atraumatic.   Eyes: EOM are normal. Right eye exhibits no discharge. Left eye exhibits no discharge.   Neck: No JVD present. "   Cardiovascular: Intact distal pulses.    Pulmonary/Chest: Effort normal. No respiratory distress.   Abdominal: She exhibits no distension.   Neurological: She is alert and oriented to person, place, and time.   Psychiatric: Judgment and thought content normal.     General Musculoskeletal Exam   Gait: antalgic     Back (L-Spine & T-Spine) / Neck (C-Spine) Exam     Tenderness Left paramedian tenderness of the Lower L-Spine and Upper L-Spine.     Back (L-Spine & T-Spine) Range of Motion   Lateral Bend Right: abnormal   Lateral Bend Left: abnormal   Rotation Right: abnormal   Rotation Left: abnormal     Spinal Sensation   Right Side Sensation  L-Spine Level: normal  Left Side Sensation  L-Spine Level: normal    Comments:  Straight leg raise positive on left.      Muscle Strength   Right Lower Extremity   Hip Flexion: 5/5   Quadriceps:  5/5   Hamstrin/5   Anterior tibial:  5/5/5  Gastrocsoleus:  5/5/5  Left Lower Extremity   Hip Flexion: 5/5   Quadriceps:  5/5   Hamstrin/5   Anterior tibial:  5/5/5   Gastrocsoleus:  5/5/5    Vascular Exam     Right Pulses  Dorsalis Pedis:      2+  Posterior Tibial:      2+        Left Pulses  Dorsalis Pedis:      2+  Posterior Tibial:      2+            Imaging:  MRI lumbar spine 10/20/17  By review of the images in this study shows multilevel degenerative disc disease most prominent at L3-4, L4-5, and L5-S1.  There is posterior bulging of discs at these levels resulting in varying levels of neuroforaminal stenosis which is most pronounced at left L5-S1.  There is also neural foraminal stenosis to a lesser degree at left L4-5.  There appears to be a T2 hyperintensity within the disc bulge suggestive of annular tear.  There is also multilevel moderate facet disease most prominent at L4-5 and L5-S1 bilaterally.      Labs:  BMP  Lab Results   Component Value Date     2017    K 3.6 2017     2017    CO2 25 2017    BUN 18 2017    CREATININE  0.73 08/24/2017    CALCIUM 9.7 08/24/2017    ANIONGAP 8 08/24/2017    ESTGFRAFRICA >60 08/24/2017    EGFRNONAA >60 08/24/2017     Lab Results   Component Value Date    ALT 41 08/24/2017    AST 19 08/24/2017    ALKPHOS 109 08/24/2017    BILITOT 0.5 08/24/2017       Diagnoses & Associated Orders:  Problem List Items Addressed This Visit     Lumbosacral disc herniation - Primary    Relevant Orders    Ambulatory Referral to Physical/Occupational Therapy    Annular tear of lumbar disc    Relevant Orders    Ambulatory Referral to Physical/Occupational Therapy    Lumbar radiculopathy    Relevant Orders    Ambulatory Referral to Physical/Occupational Therapy    Myofascial pain    Lumbar spondylosis        This is a 62-year-old female with multilevel degenerative disc disease leading to symptomatic neuroforaminal stenosis at left L4-5 and L5-S1 with excellent response to TFESI now with persistent axial low back pain likely a combination of myofascial dysfunction and facet arthropathy.  She notes reduced usage of pain meds and muscle relaxants after the TFESI.    Recommendations & Interventions:   Procedures: None at this time but may repeat the Left L4-5, L5-S1 TF CELI ×1 in the future if needed.  May consider facet interventions after PT.  Medications:    - Due to her history of depression, anxiety, and paranoid schizophrenia she is a poor candidate for chronic opioid therapy.   - Cont current medications as needed  PT/OT: [facility]erred to [facility] on 12/19/17  HEP: I recommended she start HEP based on PT training  Follow Up: Return to clinic 8-12 weeks for follow up after PT    Robert Gaspar Jr, MD  Interventional Pain Medicine / Anesthesiology      **This note was partly generated using dictation software which may occasionally result in transcription errors.**

## 2018-01-08 ENCOUNTER — TELEPHONE (OUTPATIENT)
Dept: PAIN MEDICINE | Facility: CLINIC | Age: 63
End: 2018-01-08

## 2018-01-08 NOTE — TELEPHONE ENCOUNTER
----- Message from Baylee Gross MA sent at 1/8/2018  3:17 PM CST -----  [facility][phone], ext. [phone]      ----- Message -----  From: Ap Whitlock  Sent: 1/8/2018   2:30 PM  To: Chasity Jones [facility] Staff    Margaux called to get clinical notes and order for physical therapy for the patient.    Please call and advise.

## 2018-01-08 NOTE — TELEPHONE ENCOUNTER
----- Message from Emmettbo Emma sent at 1/8/2018  4:14 PM CST -----  Contact: Margaux - [employer]  States that she is calling back to see if the patient is getting Physical Therapy ordered and what is it you all need to discuss.  Can you please call Margaux, with [employer], back at [phone] ext [phone].  Thank you

## 2018-01-09 ENCOUNTER — TELEPHONE (OUTPATIENT)
Dept: PAIN MEDICINE | Facility: CLINIC | Age: 63
End: 2018-01-09

## 2018-01-09 DIAGNOSIS — M54.16 LUMBAR RADICULOPATHY: Primary | ICD-10-CM

## 2018-01-09 DIAGNOSIS — M51.36 ANNULAR TEAR OF LUMBAR DISC: ICD-10-CM

## 2018-01-09 DIAGNOSIS — M47.816 LUMBAR SPONDYLOSIS: ICD-10-CM

## 2018-01-09 NOTE — TELEPHONE ENCOUNTER
Spoke with [employer] [employer] and they stated [facility] cannot go to [facility] for PT as it is not covered. Patient will need to go to either [facility] or [facility] in [place]. Please place order for one of these. Thanks.

## 2018-01-09 NOTE — TELEPHONE ENCOUNTER
I will place a new PT order for [facility] in [place].  Please inform the patient of the change let her know that it was due to insurance coverage.    Robert Gaspar Jr, MD  Interventional Pain Medicine / Anesthesiology

## 2018-01-10 ENCOUNTER — TELEPHONE (OUTPATIENT)
Dept: NEUROSURGERY | Facility: CLINIC | Age: 63
End: 2018-01-10

## 2018-01-10 NOTE — TELEPHONE ENCOUNTER
Pt called back and stated that she went to the doctor just before Jordan and that doctor ordered PT for her. Dr. Gaspar's office had ordered therapy. Pt is requesting that it be sent to [facility] in [place]. Please let pt know when this has been completed. Thank you.

## 2018-01-10 NOTE — TELEPHONE ENCOUNTER
----- Message from Tanika Padgett sent at 1/10/2018 10:42 AM CST -----  Patient states that office was supposed to place an order for physical therapy. She is returning Staci's call. Please call back with details at [phone].

## 2018-01-10 NOTE — TELEPHONE ENCOUNTER
According to Dr. Garcia's notes, he referred pt to pain management, not physical therapy. I attempted to call pt and inform of this information, however, was unable to reach pt or leave message.

## 2018-01-11 NOTE — TELEPHONE ENCOUNTER
Left patient a message on this matter. She cannot go to [facility] per the [employer] representative. She is going to go to [facility] in [place], the insurance is getting it approved.

## 2018-01-12 NOTE — TELEPHONE ENCOUNTER
Spoke with pt and informed of the physical therapy matter. Pt verbalized understanding. Gave pt address to [facility] in [place].

## 2018-01-22 ENCOUNTER — OFFICE VISIT (OUTPATIENT)
Dept: NEUROSURGERY | Facility: CLINIC | Age: 63
End: 2018-01-22
Payer: MEDICARE

## 2018-01-22 VITALS
WEIGHT: 167.25 LBS | HEART RATE: 89 BPM | HEIGHT: 66 IN | BODY MASS INDEX: 26.88 KG/M2 | SYSTOLIC BLOOD PRESSURE: 88 MMHG | RESPIRATION RATE: 17 BRPM | DIASTOLIC BLOOD PRESSURE: 56 MMHG

## 2018-01-22 DIAGNOSIS — M54.17 LUMBOSACRAL RADICULOPATHY AT L5: Primary | ICD-10-CM

## 2018-01-22 PROCEDURE — 99999 PR PBB SHADOW E&M-EST. PATIENT-LVL IV: CPT | Mod: PBBFAC,,, | Performed by: PHYSICIAN ASSISTANT

## 2018-01-22 PROCEDURE — 99213 OFFICE O/P EST LOW 20 MIN: CPT | Mod: S$GLB,,, | Performed by: PHYSICIAN ASSISTANT

## 2018-01-22 RX ORDER — FLUOXETINE HYDROCHLORIDE 40 MG/1
CAPSULE ORAL
Refills: 3 | COMMUNITY
Start: 2017-12-18 | End: 2021-03-09

## 2018-01-23 NOTE — PROGRESS NOTES
Neurosurgery History and Physical    Patient ID: Rosenda Hunter is a 62 y.o. female.    Chief Complaint   Patient presents with    Lumbar Spine Pain (L-Spine)     lower back pain is radiating to hip and through left lower extremity, has started PT and feels a little relief, CELI in November that gave her relief for about a month  and the pain came back.        Review of Systems   Constitutional: Positive for activity change.   HENT: Negative.    Eyes: Negative.    Respiratory: Negative.    Cardiovascular: Negative.    Gastrointestinal: Negative.    Endocrine: Negative.    Genitourinary: Negative.    Musculoskeletal: Positive for back pain and gait problem.   Skin: Positive for rash.   Allergic/Immunologic: Negative.    Neurological: Positive for numbness. Negative for weakness.   Hematological: Negative.    Psychiatric/Behavioral: Negative.        Past Medical History:   Diagnosis Date    Anxiety     Arthritis     Cellulitis of leg     COPD (chronic obstructive pulmonary disease)     Depression     Diabetes mellitus     as an adult, corrected with diet    DVT (deep venous thrombosis)     Fracture, rib     Hyperlipidemia     Hypertension     Hypothyroidism     Lumbar disc disease      Social History     Social History    Marital status:      Spouse name: N/A    Number of children: N/A    Years of education: N/A     Occupational History    Not on file.     Social History Main Topics    Smoking status: Current Every Day Smoker     Packs/day: 1.00    Smokeless tobacco: Never Used    Alcohol use No    Drug use: No    Sexual activity: Yes     Partners: Male     Other Topics Concern    Not on file     Social History Narrative    No narrative on file     Family History   Problem Relation Age of Onset    Diabetes Mother     Hypertension Mother     Hyperlipidemia Mother     Heart disease Mother     Kidney disease Mother      Review of patient's allergies indicates:   Allergen  Reactions    Ceclor [cefaclor]        Current Outpatient Prescriptions:     albuterol (ACCUNEB) 1.25 mg/3 mL Nebu, Take 3 mLs (1.25 mg total) by nebulization 3 (three) times daily as needed., Disp: 40 vial, Rfl: 5    aripiprazole (ABILIFY) 5 MG Tab, Take 5 mg by mouth once daily., Disp: , Rfl:     cilostazol (PLETAL) 100 MG Tab, TAKE 1 TABLET BY MOUTH ONCE DAILY, Disp: 90 tablet, Rfl: 3    diazePAM (VALIUM) 5 MG tablet, TAKE 1 TABLET BY MOUTH ONCE DAILY AT BEDTIME AS NEEDED FOR SLEEP, Disp: 15 tablet, Rfl: 2    FLUoxetine (PROZAC) 20 MG capsule, Take 1 capsule by mouth every night, Disp: , Rfl: 1    FLUoxetine (PROZAC) 40 MG capsule, Take 1 capsule by mouth every night take with 20mg dose for total of 60mg, Disp: , Rfl: 3    gemfibrozil (LOPID) 600 MG tablet, TAKE 1 TABLET BY MOUTH TWICE DAILY, Disp: 180 tablet, Rfl: 3    ibuprofen (ADVIL,MOTRIN) 800 MG tablet, TAKE 1 TABLET BY MOUTH EVERY 8 HOURS AS NEEDED FOR RELIEF OF PAIN, Disp: , Rfl: 0    levothyroxine (SYNTHROID) 50 MCG tablet, TAKE 1 TABLET BY MOUTH DAILY. TAKE ON EMPTY STOMACH, Disp: 90 tablet, Rfl: 3    LYRICA 75 mg capsule, TAKE 1 CAPSULE BY MOUTH 2 TIMES DAILY (Patient taking differently: TAKE 2 CAPSULE BY MOUTH NIGHTLY), Disp: 180 capsule, Rfl: 1    topiramate (TOPAMAX) 50 MG tablet, Take 1 tablet (50 mg total) by mouth 2 (two) times daily. (Patient taking differently: Take 100 mg by mouth 2 (two) times daily. ), Disp: 60 tablet, Rfl: 11    COMBIVENT RESPIMAT  mcg/actuation inhaler, INHALE 1 PUFF 3 TIMES DAILY, Disp: 1 Package, Rfl: 11    mupirocin (BACTROBAN) 2 % ointment, Apply to affected area 3 times daily (Patient taking differently: Apply to affected area 3 times daily as needed), Disp: 22 g, Rfl: 5    oxycodone-acetaminophen (PERCOCET) 5-325 mg per tablet, TAKE 1 TABLET BY MOUTH EVERY 6 HOURS AS NEEDED FOR RELIEF OF PAIN, Disp: , Rfl: 0    tizanidine (ZANAFLEX) 4 MG tablet, TAKE 1/2 TABLET BY MOUTH AT BEDTIME AS NEEDED FOR  "NECK PAIN, Disp: 10 tablet, Rfl: 5    tramadol (ULTRAM) 50 mg tablet, Take 50 mg by mouth every 6 (six) hours as needed for Pain., Disp: , Rfl:   Blood pressure (!) 88/56, pulse 89, resp. rate 17, height 5' 6" (1.676 m), weight 75.8 kg (167 lb 3.5 oz).      Neurologic Exam     Mental Status   Oriented to person, place, and time.   Attention: normal. Concentration: normal.   Speech: speech is normal   Level of consciousness: alert  Knowledge: good.     Cranial Nerves     CN II   Visual acuity: normal    CN III, IV, VI   Pupils are equal, round, and reactive to light.  Extraocular motions are normal.     CN V   Facial sensation intact.     CN VII   Facial expression full, symmetric.     CN VIII   Hearing: intact    CN IX, X   Palate: symmetric    CN XI   CN XI normal.     CN XII   CN XII normal.     Motor Exam   Muscle bulk: normal  Overall muscle tone: normal  Right arm pronator drift: absent  Left arm pronator drift: absent    Strength   Right neck flexion: 5/5  Left neck flexion: 5/5  Right neck extension: 5/5  Left neck extension: 5/5  Right deltoid: 5/5  Left deltoid: 5/5  Right biceps: 5/5  Left biceps: 5/5  Right triceps: 5/5  Left triceps: 5/5  Right wrist flexion: 5/5  Left wrist flexion: 5/5  Right wrist extension: 5/5  Left wrist extension: 5/5  Right interossei: 5/5  Left interossei: 5/5  Right abdominals: 5/5  Left abdominals: 5/5  Right iliopsoas: 5/5  Left iliopsoas: 5/5  Right quadriceps: 5/5  Left quadriceps: 5/5  Right hamstrin/5  Left hamstrin/5  Right glutei: 5/5  Left glutei: 5/5  Right anterior tibial: 5/5  Left anterior tibial: 5/5  Right posterior tibial: 5/5  Left posterior tibial: 5/5  Right peroneal: 5/5  Left peroneal: 5/5  Right gastroc: 5/5  Left gastroc: 5/5    Sensory Exam   Light touch normal.     Gait, Coordination, and Reflexes     Gait  Gait: normal    Coordination   Romberg: negative  Finger to nose coordination: normal    Tremor   Resting tremor: absent    Reflexes " "  Right brachioradialis: 2+  Left brachioradialis: 2+  Right biceps: 2+  Left biceps: 2+  Right triceps: 2+  Left triceps: 2+  Right patellar: 2+  Left patellar: 2+  Right achilles: 1+  Left achilles: 1+  Right plantar: normal  Left plantar: normal  Right Beard: absent  Left Beard: absent  Right ankle clonus: absent  Left ankle clonus: absent      Physical Exam   Constitutional: She is oriented to person, place, and time. She appears well-developed and well-nourished.   unkept   HENT:   Head: Normocephalic and atraumatic.   Eyes: EOM are normal. Pupils are equal, round, and reactive to light.   Neck: Normal range of motion. Neck supple.   Cardiovascular: Normal rate and regular rhythm.    Pulmonary/Chest: Effort normal.   Abdominal: Soft.   Musculoskeletal: Normal range of motion.   Neurological: She is alert and oriented to person, place, and time. She has a normal Finger-Nose-Finger Test and a normal Romberg Test. Gait normal.   Reflex Scores:       Tricep reflexes are 2+ on the right side and 2+ on the left side.       Bicep reflexes are 2+ on the right side and 2+ on the left side.       Brachioradialis reflexes are 2+ on the right side and 2+ on the left side.       Patellar reflexes are 2+ on the right side and 2+ on the left side.       Achilles reflexes are 1+ on the right side and 1+ on the left side.  Skin: Skin is warm and dry.   Multiple skin sores/excoriations of various ages   Psychiatric: Her speech is normal.   Odd affect   Nursing note and vitals reviewed.      Vital Signs  Pulse: 89  Resp: 17  BP: (!) 88/56  Pain Score:   6  Pain Loc: Back  Height and Weight  Height: 5' 6" (167.6 cm)  Weight: 75.8 kg (167 lb 3.5 oz)  BSA (Calculated - sq m): 1.88 sq meters  BMI (Calculated): 27  Weight in (lb) to have BMI = 25: 154.6]    Provider dictation:  Ms. Rosenda ALCARAZ AmarjitKeila presents today with her mother for follow up after CELI. She reports that the injection significantly helped her and that her pair " "only occurs when she does heavy duty house work every 2 weeks. The left leg pain has almost completely resolved except for intermittent flair ups.  She feels that her pain is "not bad enough for surgery." She no longer uses a walker to walk. She only has back pain with sweeping and mopping. She has also been using a TENS unit which is helping. She had one session of PT and is interested in continuing. Her  recently passed away in October 2017 and she recently moved in with her mother.     On MRI, she has a left L5-S1 disc herniation with an extruded fragment in the left L5-S1 foramen resulting in L5 nerve compression.     Physical exam is intact other than multiple skin wounds.     At this time, the patient is having infrequent back and left leg pain. She would like to hold off on any surgical intervention, but would like to follow up with me in 3-4 months to recheck. She will continue PT and Pain management for injections.     Visit Diagnosis:  There are no diagnoses linked to this encounter.  "

## 2018-01-30 ENCOUNTER — TELEPHONE (OUTPATIENT)
Dept: PAIN MEDICINE | Facility: CLINIC | Age: 63
End: 2018-01-30

## 2018-01-30 ENCOUNTER — TELEPHONE (OUTPATIENT)
Dept: NEUROSURGERY | Facility: CLINIC | Age: 63
End: 2018-01-30

## 2018-01-30 NOTE — TELEPHONE ENCOUNTER
----- Message from Rachael Glez sent at 1/30/2018  4:13 PM CST -----  Returning your call.  Please call [phone].

## 2018-01-30 NOTE — TELEPHONE ENCOUNTER
Patient fell sat night and is now having pain in her left knee and back. Made her an appointment to come in tomorrow to see Dr Gaspar as she is asking for something for the pain.

## 2018-01-30 NOTE — TELEPHONE ENCOUNTER
----- Message from Jocelyn Gregory sent at 1/30/2018  9:50 AM CST -----  Contact: self  Patient called stated she fell on 01/27. She reports her knees hit first. She states she's experiencing neck, back and left leg pain. Patient asking if Dr. Garcia would like for her to continue therapy. Patient call back [phone].

## 2018-01-31 ENCOUNTER — OFFICE VISIT (OUTPATIENT)
Dept: PAIN MEDICINE | Facility: CLINIC | Age: 63
End: 2018-01-31
Payer: MEDICARE

## 2018-01-31 VITALS
WEIGHT: 172.63 LBS | TEMPERATURE: 97 F | HEART RATE: 80 BPM | SYSTOLIC BLOOD PRESSURE: 102 MMHG | BODY MASS INDEX: 27.74 KG/M2 | RESPIRATION RATE: 20 BRPM | HEIGHT: 66 IN | DIASTOLIC BLOOD PRESSURE: 59 MMHG
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
## 2018-01-31 DIAGNOSIS — M25.512 ACUTE PAIN OF LEFT SHOULDER: ICD-10-CM

## 2018-01-31 DIAGNOSIS — M25.562 ACUTE PAIN OF LEFT KNEE: Primary | ICD-10-CM

## 2018-01-31 DIAGNOSIS — M25.552 ACUTE PAIN OF LEFT HIP: ICD-10-CM
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
## 2018-01-31 PROCEDURE — 99999 PR PBB SHADOW E&M-EST. PATIENT-LVL III: CPT | Mod: PBBFAC,,, | Performed by: PAIN MEDICINE

## 2018-01-31 PROCEDURE — 99214 OFFICE O/P EST MOD 30 MIN: CPT | Mod: S$GLB,,, | Performed by: PAIN MEDICINE

## 2018-01-31 PROCEDURE — 3008F BODY MASS INDEX DOCD: CPT | Mod: S$GLB,,, | Performed by: PAIN MEDICINE

## 2018-01-31 RX ORDER — HYDROCODONE BITARTRATE AND ACETAMINOPHEN 5; 325 MG/1; MG/1
1 TABLET ORAL EVERY 8 HOURS PRN
Qty: 30 TABLET | Refills: 0 | Status: SHIPPED | OUTPATIENT
Start: 2018-01-31 | End: 2018-02-15

## 2018-01-31 RX ORDER — MELOXICAM 15 MG/1
15 TABLET ORAL DAILY
Qty: 15 TABLET | Refills: 0 | Status: SHIPPED | OUTPATIENT
Start: 2018-01-31 | End: 2018-02-15

## 2018-01-31 NOTE — PROGRESS NOTES
[facility] New Patient Evaluation    Referred by: Dr. Gonzalez  Reason for referral: Left LBP with Radiculopathy    CC:   Chief Complaint   Patient presents with    Low-back Pain    Neck Pain    Knee Pain     LEFT    Hip Pain     LEFT     Interval Update:  18 - She returns today repotting acute pain due to fall at home when she tripped over her exercise equipment on 18.  She reports left sided knee, hip, and shoulder pain rated at a 7/10.  She took one of her  's Hydromorphone 2 mg tablets this morning for pain relief.  She is currently in PT as prescribed but missed the most recent appointment due to pain from the fall.  She is requesting medications for her acute pain.    17 - Overall she is doing a lot better with much less pain and limitation in function.  She can stand and walking longer without pain down the legs.  Her pain is rated 2/10 and non-radiating; not worse than 3/10 in the last week; no need for pain medications since the injection.     Background: Rosenda Hunter is a pleasant 62 y.o. female who presented to my clinic complaining of low back pain radiating into the left leg as characterized below.  She is accompanied by her mother.    Location: Low back and left leg  Severity: Currently: 8/10   Typical Range: 4-8/10     Exacerbation: 10/10  Onset: Early summer of 2017 associated with fall  Quality: Aching, Throbbing and Shooting  Radiation: Left posterior thigh and leg, left lateral thigh and leg down to ankle.  Axial/Extremity Percentage of Pain: 30/70  Exacerbating Factors: extension and lying down  Mitigating Factors: rest  Assoc: denies night fever/night sweats, urinary incontinence, bowel incontinence, significant weight loss, significant motor weakness and loss of sensations    Previous Therapies:  PT: Currently engaged for low back rehab  OT:   HEP: Endorses daily performance until fall on 18  TENS:  Injections: Denies  Surgery:  Denies  Other:   Medications: Denies previous medications other than what is listed below.  She has been on Lyrica for quite a while.    Current Pain Medications:  1. Tramadol 50 every 6 when necessary  2. Tizanidine 2 mg daily at bedtime  3. Gabapentin 300 3 times a day  4. Lyrica 75 twice a day     Full Medication List:    Current Outpatient Prescriptions:     albuterol (ACCUNEB) 1.25 mg/3 mL Nebu, Take 3 mLs (1.25 mg total) by nebulization 3 (three) times daily as needed., Disp: 40 vial, Rfl: 5    aripiprazole (ABILIFY) 5 MG Tab, Take 5 mg by mouth once daily., Disp: , Rfl:     cilostazol (PLETAL) 100 MG Tab, TAKE 1 TABLET BY MOUTH ONCE DAILY, Disp: 90 tablet, Rfl: 3    COMBIVENT RESPIMAT  mcg/actuation inhaler, INHALE 1 PUFF 3 TIMES DAILY, Disp: 1 Package, Rfl: 11    diazePAM (VALIUM) 5 MG tablet, TAKE 1 TABLET BY MOUTH ONCE DAILY AT BEDTIME AS NEEDED FOR SLEEP, Disp: 15 tablet, Rfl: 2    FLUoxetine (PROZAC) 20 MG capsule, Take 1 capsule by mouth every night, Disp: , Rfl: 1    FLUoxetine (PROZAC) 40 MG capsule, Take 1 capsule by mouth every night take with 20mg dose for total of 60mg, Disp: , Rfl: 3    gemfibrozil (LOPID) 600 MG tablet, TAKE 1 TABLET BY MOUTH TWICE DAILY, Disp: 180 tablet, Rfl: 3    levothyroxine (SYNTHROID) 50 MCG tablet, TAKE 1 TABLET BY MOUTH DAILY. TAKE ON EMPTY STOMACH, Disp: 90 tablet, Rfl: 3    LYRICA 75 mg capsule, TAKE 1 CAPSULE BY MOUTH 2 TIMES DAILY (Patient taking differently: TAKE 2 CAPSULE BY MOUTH NIGHTLY), Disp: 180 capsule, Rfl: 1    tizanidine (ZANAFLEX) 4 MG tablet, TAKE 1/2 TABLET BY MOUTH AT BEDTIME AS NEEDED FOR NECK PAIN, Disp: 10 tablet, Rfl: 5    topiramate (TOPAMAX) 50 MG tablet, Take 1 tablet (50 mg total) by mouth 2 (two) times daily. (Patient taking differently: Take 100 mg by mouth 2 (two) times daily. ), Disp: 60 tablet, Rfl: 11    ibuprofen (ADVIL,MOTRIN) 800 MG tablet, TAKE 1 TABLET BY MOUTH EVERY 8 HOURS AS NEEDED FOR RELIEF OF PAIN, Disp: ,  Rfl: 0    mupirocin (BACTROBAN) 2 % ointment, Apply to affected area 3 times daily (Patient taking differently: Apply to affected area 3 times daily as needed), Disp: 22 g, Rfl: 5    oxycodone-acetaminophen (PERCOCET) 5-325 mg per tablet, TAKE 1 TABLET BY MOUTH EVERY 6 HOURS AS NEEDED FOR RELIEF OF PAIN, Disp: , Rfl: 0    tramadol (ULTRAM) 50 mg tablet, Take 50 mg by mouth every 6 (six) hours as needed for Pain., Disp: , Rfl:      Review of Systems:  Review of Systems   Constitutional: Negative for chills and fever.   HENT: Negative for nosebleeds.    Eyes: Negative for pain.   Respiratory: Negative for hemoptysis.    Cardiovascular: Negative for chest pain.   Gastrointestinal: Negative for nausea and vomiting.   Genitourinary: Negative for dysuria.   Musculoskeletal: Positive for back pain, falls and joint pain.   Skin: Negative for rash.   Neurological: Negative for seizures.   Endo/Heme/Allergies: Bruises/bleeds easily.   Psychiatric/Behavioral: Negative for hallucinations.       Allergies:  Ceclor [cefaclor]     Medical History:  Past Medical History:   Diagnosis Date    Anxiety     Arthritis     Cellulitis of leg     COPD (chronic obstructive pulmonary disease)     Depression     Diabetes mellitus     as an adult, corrected with diet    DVT (deep venous thrombosis)     Fracture, rib     Hyperlipidemia     Hypertension     Hypothyroidism     Lumbar disc disease         Surgical History:  Past Surgical History:   Procedure Laterality Date    DENTAL SURGERY      hien          Social History:  Social History     Social History    Marital status:      Spouse name: N/A    Number of children: N/A    Years of education: N/A     Occupational History    Not on file.     Social History Main Topics    Smoking status: Current Every Day Smoker     Packs/day: 1.00    Smokeless tobacco: Never Used    Alcohol use No    Drug use: No    Sexual activity: Yes     Partners: Male     Other Topics  "Concern    Not on file     Social History Narrative    No narrative on file       Physical Exam:  Vitals:    18 1003   BP: (!) 102/59   Pulse: 80   Resp: 20   Temp: 96.9 °F (36.1 °C)   TempSrc: Oral   Weight: 78.3 kg (172 lb 9.9 oz)   Height: 5' 6" (1.676 m)   PainSc:   7   PainLoc: Back     General    Nursing note and vitals reviewed.  Constitutional: She is oriented to person, place, and time. She appears well-developed and well-nourished. No distress.   HENT:   Head: Atraumatic.   Eyes: EOM are normal. Right eye exhibits no discharge. Left eye exhibits no discharge.   Neck: No JVD present.   Cardiovascular: Intact distal pulses.    Pulmonary/Chest: Effort normal. No respiratory distress.   Abdominal: She exhibits no distension.   Neurological: She is alert and oriented to person, place, and time.   Psychiatric: Judgment and thought content normal.     General Musculoskeletal Exam   Gait: antalgic     Back (L-Spine & T-Spine) / Neck (C-Spine) Exam     Tenderness Left paramedian tenderness of the Lower L-Spine and Upper L-Spine.     Back (L-Spine & T-Spine) Range of Motion   Lateral Bend Right: abnormal   Lateral Bend Left: abnormal   Rotation Right: abnormal   Rotation Left: abnormal     Spinal Sensation   Right Side Sensation  L-Spine Level: normal  Left Side Sensation  L-Spine Level: normal    Comments:  Straight leg raise positive on left.      Muscle Strength   Right Lower Extremity   Hip Flexion: 5/5   Quadriceps:  5/5   Hamstrin/5   Anterior tibial:  5/5/5  Gastrocsoleus:  5/5/5  Left Lower Extremity   Hip Flexion: 5/5   Quadriceps:  5/5   Hamstrin/5   Anterior tibial:  5/5/5   Gastrocsoleus:  5/5/5    Vascular Exam     Right Pulses  Dorsalis Pedis:      2+  Posterior Tibial:      2+        Left Pulses  Dorsalis Pedis:      2+  Posterior Tibial:      2+            Imaging:  MRI lumbar spine 10/20/17  By review of the images in this study shows multilevel degenerative disc disease most " prominent at L3-4, L4-5, and L5-S1.  There is posterior bulging of discs at these levels resulting in varying levels of neuroforaminal stenosis which is most pronounced at left L5-S1.  There is also neural foraminal stenosis to a lesser degree at left L4-5.  There appears to be a T2 hyperintensity within the disc bulge suggestive of annular tear.  There is also multilevel moderate facet disease most prominent at L4-5 and L5-S1 bilaterally.      Labs:  BMP  Lab Results   Component Value Date     01/22/2018    K 4.0 01/22/2018     01/22/2018    CO2 27 01/22/2018    BUN 6 (L) 01/22/2018    CREATININE 0.73 01/22/2018    CALCIUM 9.3 01/22/2018    ANIONGAP 9 01/22/2018    ESTGFRAFRICA >60 01/22/2018    EGFRNONAA >60 01/22/2018     Lab Results   Component Value Date    ALT 26 01/22/2018    AST 13 (L) 01/22/2018    ALKPHOS 85 01/22/2018    BILITOT 0.5 01/22/2018       Diagnoses & Associated Orders:  Problem List Items Addressed This Visit     Acute pain of left knee - Primary    Acute pain of left hip    Acute pain of left shoulder    Fall at home, initial encounter        This is a 62-year-old female with multilevel degenerative disc disease leading to symptomatic neuroforaminal stenosis at left L4-5 and L5-S1 with excellent response to TFESI now with persistent axial low back pain likely a combination of myofascial dysfunction and facet arthropathy.  She notes reduced usage of pain meds and muscle relaxants after the TFESI.    Recommendations & Interventions:   Procedures: None at this time but may repeat the Left L4-5, L5-S1 TF CELI ×1 in the future if needed.  May consider facet interventions after PT.  Medications:    - Due to her history of depression, anxiety, and paranoid schizophrenia she is a poor candidate for chronic opioid therapy.   - Cont current medications as needed   - Start short course of Norco 5-235 q8 #30 for acute pain   - Start Short course of Meloxicam 15 daily for acute pain   - Cont  Tizanidine 4 mg QHS for muscle relaxation   - Cont conservative measures- heating pad, warm bath  PT/OT: Cont PT with [facility] [place]  HEP: Take short break from HEP to recover from fall, then return to normal routine  Follow Up: Return to clinic 4-6 weeks for assessment after PT    Robert Gaspar Jr, MD  Interventional Pain Medicine / Anesthesiology      **This note was partly generated using dictation software which may occasionally result in transcription errors.**

## 2018-02-15 PROBLEM — E27.1 ADDISON'S DISEASE: Status: ACTIVE | Noted: 2018-02-15

## 2018-05-09 PROBLEM — R42 DIZZINESS: Status: ACTIVE | Noted: 2018-05-09

## 2018-05-09 PROBLEM — E27.1 ADDISON'S DISEASE: Status: RESOLVED | Noted: 2018-02-15 | Resolved: 2018-05-09

## 2018-05-09 PROBLEM — L73.9 FOLLICULITIS: Status: ACTIVE | Noted: 2018-05-09

## 2018-05-22 ENCOUNTER — PATIENT MESSAGE (OUTPATIENT)
Dept: NEUROSURGERY | Facility: CLINIC | Age: 63
End: 2018-05-22

## 2018-05-22 ENCOUNTER — OFFICE VISIT (OUTPATIENT)
Dept: NEUROSURGERY | Facility: CLINIC | Age: 63
End: 2018-05-22
Payer: MEDICARE

## 2018-05-22 VITALS
WEIGHT: 169.63 LBS | HEIGHT: 65 IN | HEART RATE: 60 BPM | BODY MASS INDEX: 28.26 KG/M2 | DIASTOLIC BLOOD PRESSURE: 66 MMHG | SYSTOLIC BLOOD PRESSURE: 103 MMHG

## 2018-05-22 DIAGNOSIS — M54.2 NECK PAIN: ICD-10-CM

## 2018-05-22 DIAGNOSIS — M51.26 LUMBAR HERNIATED DISC: Primary | ICD-10-CM

## 2018-05-22 PROCEDURE — 99999 PR PBB SHADOW E&M-EST. PATIENT-LVL III: CPT | Mod: PBBFAC,,, | Performed by: PHYSICIAN ASSISTANT

## 2018-05-22 PROCEDURE — 99214 OFFICE O/P EST MOD 30 MIN: CPT | Mod: S$GLB,,, | Performed by: PHYSICIAN ASSISTANT

## 2018-05-22 PROCEDURE — 3008F BODY MASS INDEX DOCD: CPT | Mod: CPTII,S$GLB,, | Performed by: PHYSICIAN ASSISTANT

## 2018-06-04 NOTE — PROGRESS NOTES
Neurosurgery History & Physical    Patient ID: Rosenda Velázquez is a 63 y.o. female.    Chief Complaint   Patient presents with    Lumbar Spine Pain (L-Spine)     pt states pain only when standing up or sitting down, goes away with exercises from PT, pt states increase in diarrhea, denies bladder issues, denies tingling and numbness, completed PT and is still doing exercises at home, CELI helped some    Cervical Spine Pain (C-spine)     pt states neck hurts on in c-spine area, difficulty turning neck from side-to-side, increase in headaches, denies numbness and tingling       Review of Systems   Constitutional: Negative for chills, diaphoresis, fatigue and fever.   HENT: Negative for congestion, ear pain, rhinorrhea, sneezing, sore throat and tinnitus.    Eyes: Negative for photophobia, pain, redness and visual disturbance.   Respiratory: Negative for cough, chest tightness, shortness of breath and wheezing.    Cardiovascular: Negative for chest pain, palpitations and leg swelling.   Gastrointestinal: Negative for abdominal distention, abdominal pain, constipation, diarrhea, nausea and vomiting.   Genitourinary: Negative for difficulty urinating, dysuria, frequency and urgency.   Musculoskeletal: Positive for neck pain. Negative for back pain, gait problem and myalgias.   Skin: Negative for pallor and rash.   Neurological: Negative for dizziness, seizures, speech difficulty, weakness, numbness and headaches.   Psychiatric/Behavioral: Negative for confusion and hallucinations.       Past Medical History:   Diagnosis Date    Anxiety     Arthritis     Cellulitis of leg     COPD (chronic obstructive pulmonary disease)     Depression     Diabetes mellitus     as an adult, corrected with diet    DVT (deep venous thrombosis)     Fracture, rib     Hyperlipidemia     Hypertension     Hypothyroidism     Lumbar disc disease      Social History     Social History    Marital status:      Spouse name:  "N/A    Number of children: N/A    Years of education: N/A     Occupational History    Not on file.     Social History Main Topics    Smoking status: Current Every Day Smoker     Packs/day: 1.00    Smokeless tobacco: Never Used    Alcohol use No    Drug use: No    Sexual activity: Yes     Partners: Male     Other Topics Concern    Not on file     Social History Narrative    No narrative on file     Family History   Problem Relation Age of Onset    Diabetes Mother     Hypertension Mother     Hyperlipidemia Mother     Heart disease Mother     Kidney disease Mother     Breast cancer Paternal Aunt      Review of patient's allergies indicates:   Allergen Reactions    Ceclor [cefaclor] Swelling       Current Outpatient Prescriptions:     albuterol (ACCUNEB) 1.25 mg/3 mL Nebu, Take 3 mLs (1.25 mg total) by nebulization 3 (three) times daily as needed., Disp: 40 vial, Rfl: 5    aripiprazole (ABILIFY) 5 MG Tab, Take 5 mg by mouth once daily., Disp: , Rfl:     COMBIVENT RESPIMAT  mcg/actuation inhaler, INHALE 1 PUFF 3 TIMES DAILY SHAKE WELL RINSE MOUTH AFTER USING, Disp: 4 g, Rfl: 5    diazePAM (VALIUM) 5 MG tablet, TAKE 1 TABLET BY MOUTH ONCE DAILY AT BEDTIME AS NEEDED FOR SLEEP, Disp: 15 tablet, Rfl: 2    FLUoxetine (PROZAC) 20 MG capsule, Take 1 capsule by mouth every night, Disp: , Rfl: 1    FLUoxetine (PROZAC) 40 MG capsule, Take 1 capsule by mouth every night take with 20mg dose for total of 60mg, Disp: , Rfl: 3    levothyroxine (SYNTHROID) 50 MCG tablet, TAKE 1 TABLET BY MOUTH DAILY. TAKE ON EMPTY STOMACH, Disp: 90 tablet, Rfl: 3    LYRICA 75 mg capsule, TAKE 1 CAPSULE BY MOUTH 2 TIMES DAILY, Disp: 180 capsule, Rfl: 1    doxycycline (ADOXA) 50 MG tablet, Take 1 tablet (50 mg total) by mouth 2 (two) times daily., Disp: 20 tablet, Rfl: 1  Blood pressure 103/66, pulse 60, height 5' 5" (1.651 m), weight 76.9 kg (169 lb 10.3 oz).      Neurologic Exam  Mental Status   Oriented to person, place, " and time.   Attention: normal. Concentration: normal.   Speech: speech is normal   Level of consciousness: alert  Knowledge: good.      Cranial Nerves      CN II   Visual acuity: normal     CN III, IV, VI   Pupils are equal, round, and reactive to light.  Extraocular motions are normal.      CN V   Facial sensation intact.      CN VII   Facial expression full, symmetric.      CN VIII   Hearing: intact     CN IX, X   Palate: symmetric     CN XI   CN XI normal.      CN XII   CN XII normal.      Motor Exam   Muscle bulk: normal  Overall muscle tone: normal  Right arm pronator drift: absent  Left arm pronator drift: absent     Strength   Right neck flexion: 5/5  Left neck flexion: 5/5  Right neck extension: 5/5  Left neck extension: 5/5  Right deltoid: 5/5  Left deltoid: 5/5  Right biceps: 5/5  Left biceps: 5/5  Right triceps: 5/5  Left triceps: 5/5  Right wrist flexion: 5/5  Left wrist flexion: 5/5  Right wrist extension: 5/5  Left wrist extension: 5/5  Right interossei: 5/5  Left interossei: 5/5  Right abdominals: 5/5  Left abdominals: 5/5  Right iliopsoas: 5/5  Left iliopsoas: 5/5  Right quadriceps: 5/5  Left quadriceps: 5/5  Right hamstrin/5  Left hamstrin/5  Right glutei: 5/5  Left glutei: 5/5  Right anterior tibial: 5/5  Left anterior tibial: 5/5  Right posterior tibial: 5/5  Left posterior tibial: 5/5  Right peroneal: 5/5  Left peroneal: 5/5  Right gastroc: 5/5  Left gastroc: 5/5     Sensory Exam   Light touch normal.      Gait, Coordination, and Reflexes      Gait  Gait: normal     Coordination   Romberg: negative  Finger to nose coordination: normal     Tremor   Resting tremor: absent     Reflexes   Right brachioradialis: 2+  Left brachioradialis: 2+  Right biceps: 2+  Left biceps: 2+  Right triceps: 2+  Left triceps: 2+  Right patellar: 2+  Left patellar: 2+  Right achilles: 1+  Left achilles: 1+  Right plantar: normal  Left plantar: normal  Right Baerd: absent  Left Beard: absent  Right ankle  "clonus: absent  Left ankle clonus: absent     Physical Exam  Constitutional: She is oriented to person, place, and time. She appears well-developed and well-nourished.   unkept   HENT:   Head: Normocephalic and atraumatic.   Eyes: EOM are normal. Pupils are equal, round, and reactive to light.   Neck: Normal range of motion. Neck supple.   Cardiovascular: Normal rate and regular rhythm.    Pulmonary/Chest: Effort normal.   Abdominal: Soft.   Musculoskeletal: Normal range of motion.   + mild pain with cervical ROM  Denies tenderness to cervical spine palpation  Neurological: She is alert and oriented to person, place, and time. She has a normal Finger-Nose-Finger Test and a normal Romberg Test. Gait normal.   Reflex Scores:       Tricep reflexes are 2+ on the right side and 2+ on the left side.       Bicep reflexes are 2+ on the right side and 2+ on the left side.       Brachioradialis reflexes are 2+ on the right side and 2+ on the left side.       Patellar reflexes are 2+ on the right side and 2+ on the left side.       Achilles reflexes are 1+ on the right side and 1+ on the left side.  Skin: Skin is warm and dry.   Multiple skin sores/excoriations of various ages   Psychiatric: Her speech is normal.   Odd affect   Nursing note and vitals reviewed.      Provider dictation:    Ms. Rosenda OcasioErickMelania presents today with her mother for follow up after CELI/PT. Patient was last seen by Nicole Gaspar PA-C on 1/22/2018. She reports that the injection significantly helped her and that her pain only occurs when she does heavy duty house work every 2 weeks. She underwent a left L4-5, L5-S1 transforaminal CELI with Dr. Gaspar on 11/21/2017. The left leg pain has almost completely resolved except for intermittent flair ups.  She feels that her pain is "not bad enough for surgery." She no longer uses a walker to walk. She only has back pain with sweeping and mopping. She has also been using a TENS unit which is " helping. She continues to participate in PT and reports improvement. Her  recently passed away in October 2017 and she recently moved in with her mother. She does report a fall on 1/27/2018 after tripping over come exercise equipment at home. She reports intermittent neck pain with ROM since the fall. Denies upper extremity pain or numbness. Denies upper extremity weakness.     On MRI, she has a left L5-S1 disc herniation with an extruded fragment in the left L5-S1 foramen resulting in L5 nerve compression. No imaging of cervical spine to review.      Physical exam is intact other than multiple skin wounds and mild neck pain with ROM.    Back and leg pain has resolved with conservative treatment. I would like to obtain a CT cervical spine to rule out fracture following fall. I will call her with the results. I do not anticipate any surgical intervention in the near future. She can continue PT and follow-up with Dr. Gaspar. She was informed to call the clinic with any further questions or concerns.     1. Lumbar herniated disc     2. Neck pain  CT Cervical Spine Without Contrast

## 2018-12-17 LAB — NONINV COLON CA DNA+OCC BLD SCRN STL QL: POSITIVE

## 2019-03-25 PROBLEM — M50.90 CERVICAL DISC DISEASE: Status: ACTIVE | Noted: 2019-03-25

## 2019-04-01 ENCOUNTER — TELEPHONE (OUTPATIENT)
Dept: NEUROSURGERY | Facility: CLINIC | Age: 64
End: 2019-04-01

## 2019-04-01 NOTE — TELEPHONE ENCOUNTER
----- Message from Heather Montenegro sent at 4/1/2019  8:20 AM CDT -----  Contact: self  Type:  Sooner Apoointment Request    Caller is requesting a sooner appointment.  Caller declined first available appointment listed below.  Caller will not accept being placed on the waitlist and is requesting a message be sent to doctor.  Name of Caller:pt   When is the first available appointment? N/a  Symptoms: Cervical disc disease  Would the patient rather a call back or a response via [facility]? Call back  Best Call Back Number: [phone]  Additional Information: Pt would like an appt asap

## 2019-04-02 ENCOUNTER — OFFICE VISIT (OUTPATIENT)
Dept: NEUROSURGERY | Facility: CLINIC | Age: 64
End: 2019-04-02
Payer: MEDICARE

## 2019-04-02 ENCOUNTER — OFFICE VISIT (OUTPATIENT)
Dept: PAIN MEDICINE | Facility: CLINIC | Age: 64
End: 2019-04-02
Payer: MEDICARE

## 2019-04-02 VITALS
BODY MASS INDEX: 26.08 KG/M2 | WEIGHT: 162.25 LBS | SYSTOLIC BLOOD PRESSURE: 100 MMHG | HEIGHT: 66 IN | DIASTOLIC BLOOD PRESSURE: 60 MMHG

## 2019-04-02 VITALS
RESPIRATION RATE: 20 BRPM | TEMPERATURE: 97 F | OXYGEN SATURATION: 96 % | DIASTOLIC BLOOD PRESSURE: 59 MMHG | WEIGHT: 160.94 LBS | SYSTOLIC BLOOD PRESSURE: 101 MMHG | HEART RATE: 67 BPM | BODY MASS INDEX: 25.98 KG/M2

## 2019-04-02 DIAGNOSIS — M47.812 SPONDYLOSIS OF CERVICAL REGION WITHOUT MYELOPATHY OR RADICULOPATHY: Primary | ICD-10-CM

## 2019-04-02 DIAGNOSIS — M54.2 NECK PAIN: Primary | ICD-10-CM

## 2019-04-02 DIAGNOSIS — M50.90 CERVICAL DISC DISEASE: Primary | ICD-10-CM

## 2019-04-02 DIAGNOSIS — G56.03 BILATERAL CARPAL TUNNEL SYNDROME: ICD-10-CM

## 2019-04-02 DIAGNOSIS — M54.2 NECK PAIN: ICD-10-CM

## 2019-04-02 PROCEDURE — 99999 PR PBB SHADOW E&M-EST. PATIENT-LVL II: CPT | Mod: PBBFAC,,, | Performed by: NEUROLOGICAL SURGERY

## 2019-04-02 PROCEDURE — 99214 OFFICE O/P EST MOD 30 MIN: CPT | Mod: S$GLB,,, | Performed by: ANESTHESIOLOGY

## 2019-04-02 PROCEDURE — 99214 PR OFFICE/OUTPT VISIT, EST, LEVL IV, 30-39 MIN: ICD-10-PCS | Mod: S$GLB,,, | Performed by: NEUROLOGICAL SURGERY

## 2019-04-02 PROCEDURE — 3008F BODY MASS INDEX DOCD: CPT | Mod: CPTII,S$GLB,, | Performed by: ANESTHESIOLOGY

## 2019-04-02 PROCEDURE — 3008F PR BODY MASS INDEX (BMI) DOCUMENTED: ICD-10-PCS | Mod: CPTII,S$GLB,, | Performed by: NEUROLOGICAL SURGERY

## 2019-04-02 PROCEDURE — 99999 PR PBB SHADOW E&M-EST. PATIENT-LVL III: CPT | Mod: PBBFAC,,, | Performed by: ANESTHESIOLOGY

## 2019-04-02 PROCEDURE — 3008F BODY MASS INDEX DOCD: CPT | Mod: CPTII,S$GLB,, | Performed by: NEUROLOGICAL SURGERY

## 2019-04-02 PROCEDURE — 99999 PR PBB SHADOW E&M-EST. PATIENT-LVL III: ICD-10-PCS | Mod: PBBFAC,,, | Performed by: ANESTHESIOLOGY

## 2019-04-02 PROCEDURE — 99214 PR OFFICE/OUTPT VISIT, EST, LEVL IV, 30-39 MIN: ICD-10-PCS | Mod: S$GLB,,, | Performed by: ANESTHESIOLOGY

## 2019-04-02 PROCEDURE — 99214 OFFICE O/P EST MOD 30 MIN: CPT | Mod: S$GLB,,, | Performed by: NEUROLOGICAL SURGERY

## 2019-04-02 PROCEDURE — 99999 PR PBB SHADOW E&M-EST. PATIENT-LVL II: ICD-10-PCS | Mod: PBBFAC,,, | Performed by: NEUROLOGICAL SURGERY

## 2019-04-02 PROCEDURE — 3008F PR BODY MASS INDEX (BMI) DOCUMENTED: ICD-10-PCS | Mod: CPTII,S$GLB,, | Performed by: ANESTHESIOLOGY

## 2019-04-02 RX ORDER — HYDROCODONE BITARTRATE AND ACETAMINOPHEN 5; 325 MG/1; MG/1
1 TABLET ORAL EVERY 12 HOURS PRN
Qty: 14 TABLET | Refills: 0 | Status: SHIPPED | OUTPATIENT
Start: 2019-04-02 | End: 2019-10-08

## 2019-04-02 NOTE — PROGRESS NOTES
[facility] Follow Up Evaluation    Referred by: Dr. Garcia  Reason for referral: neck pain    CC:   Chief Complaint   Patient presents with    Neck Pain      Last 3 PDI Scores 4/2/2019 1/31/2018 12/19/2017   Pain Disability Index (PDI) 5 44 16       HPI:   Rosenda Hunter is a 64 y.o. female who complains of neck pain    Onset: a couple months  Progression: since onset, pain is stable  Current Pain Score: 8/10  Typical Range: 6-8/10  Timing: constant  Quality: Aching and Dull  Radiation: no  Associated numbness or weakness: yes numbness, tingling right hand  Exacerbated by: nothing in particular  Allievated by: nothing  Is Pain Level Acceptable?: No    Previous Therapies:  PT/OT: yes in the past for the lumbar spine  HEP:   Interventions:   Previous:  11/21/17 -  Left L4-5, L5-S1 TFESI  Surgery:  Medications:   - NSAIDS:   - MSK Relaxants:   - TCAs:   - SNRIs:   - Topicals:   - Anticonvulsants:  - Opioids: hydrocodone     Current Pain Medications:  1. Lyrica 75mg BID    History:    Current Outpatient Medications:     albuterol (ACCUNEB) 1.25 mg/3 mL Nebu, Take 3 mLs (1.25 mg total) by nebulization 3 (three) times daily as needed., Disp: 75 mL, Rfl: 5    aripiprazole (ABILIFY) 5 MG Tab, Take 5 mg by mouth once daily., Disp: , Rfl:     clindamycin (CLEOCIN HCL) 150 MG capsule, Take 1 capsule (150 mg total) by mouth 4 (four) times daily. for 10 days, Disp: 40 capsule, Rfl: 3    COMBIVENT RESPIMAT  mcg/actuation inhaler, INHALE 1 PUFF 3 TIMES DAILY SHAKE WELL RINSE MOUTH AFTER USING, Disp: 4 g, Rfl: 5    cyanocobalamin, vitamin B-12, 1,000 mcg/15 mL Liqd, Take by mouth once daily., Disp: , Rfl:     diazePAM (VALIUM) 5 MG tablet, TAKE 1 TABLET BY MOUTH ONCE DAILY AT BEDTIME AS NEEDED FOR SLEEP, Disp: 15 tablet, Rfl: 2    FLUoxetine (PROZAC) 20 MG capsule, Take 1 capsule by mouth every night, Disp: , Rfl: 1    FLUoxetine (PROZAC) 40 MG capsule, Take 1 capsule by mouth every night take  with 20mg dose for total of 60mg, Disp: , Rfl: 3    HYDROcodone-acetaminophen (NORCO) 5-325 mg per tablet, Take 1 tablet by mouth every 12 (twelve) hours as needed for Pain., Disp: 14 tablet, Rfl: 0    levothyroxine (SYNTHROID) 50 MCG tablet, TAKE 1 TABLET BY MOUTH DAILY. TAKE ON EMPTY STOMACH, Disp: 90 tablet, Rfl: 3    LYRICA 75 mg capsule, TAKE 1 CAPSULE BY MOUTH 2 TIMES DAILY, Disp: 180 capsule, Rfl: 1    Past Medical History:   Diagnosis Date    Anxiety     Arthritis     Cellulitis of leg     COPD (chronic obstructive pulmonary disease)     Depression     Diabetes mellitus     as an adult, corrected with diet    DVT (deep venous thrombosis)     Fracture, rib     Hyperlipidemia     Hypertension     Hypothyroidism     Lumbar disc disease        Past Surgical History:   Procedure Laterality Date    DENTAL SURGERY      hien      HIEN-TRANSFORAMINAL L4/5 and L5/S1 Left 11/21/2017    Performed by Robert Gaspar Jr[facility] OR       Family History   Problem Relation Age of Onset    Diabetes Mother     Hypertension Mother     Hyperlipidemia Mother     Heart disease Mother     Kidney disease Mother     Breast cancer Paternal Aunt        Social History     Socioeconomic History    Marital status:      Spouse name: Not on file    Number of children: Not on file    Years of education: Not on file    Highest education level: Not on file   Occupational History    Not on file   Social Needs    Financial resource strain: Not on file    Food insecurity:     Worry: Not on file     Inability: Not on file    Transportation needs:     Medical: Not on file     Non-medical: Not on file   Tobacco Use    Smoking status: Current Every Day Smoker     Packs/day: 1.00    Smokeless tobacco: Never Used   Substance and Sexual Activity    Alcohol use: No    Drug use: No    Sexual activity: Yes     Partners: Male   Lifestyle    Physical activity:     Days per week: Not on file     Minutes per  session: Not on file    Stress: Not on file   Relationships    Social connections:     Talks on phone: Not on file     Gets together: Not on file     Attends Judaism service: Not on file     Active member of club or organization: Not on file     Attends meetings of clubs or organizations: Not on file     Relationship status: Not on file   Other Topics Concern    Not on file   Social History Narrative    Not on file       Review of patient's allergies indicates:   Allergen Reactions    Ceclor [cefaclor] Swelling       Review of Systems:  There have been no changes since the patient was last seen by Dr. Gaspar in the office on 1/31/18 except for - dermatologic + lesions over her hands and neck    Physical Exam:  Vitals:    04/02/19 1418   BP: (!) 101/59   Pulse: 67   Resp: 20   Temp: 96.5 °F (35.8 °C)   TempSrc: Oral   SpO2: 96%   Weight: 73 kg (160 lb 15 oz)   PainSc:   8   PainLoc: Neck     Body mass index is 25.98 kg/m².    Imaging:  MRI cervical 2/19/19  FINDINGS:  There is loss of normal lordotic curvature with mild kyphosis centered at the C6 vertebral body level.  There is mild levo scoliotic curvature centered at the C5 vertebral body level.  There is 3 mm anterolisthesis of C4 on C5.  No other malalignment.  Mild degenerative endplate edema is present at C7-T1.  Otherwise, bone marrow signal throughout the cervical spine is within normal limits.  Craniocervical junction appears normal.  Unremarkable paraspinal soft tissues.    C2-C3: No central canal or foraminal narrowing.    C3-C4: 3 mm disc bulge with mild bilateral hypertrophic facet changes.  Mild to moderate central canal stenosis, worst at the right lateral recess.  Moderate to severe right neural foraminal narrowing.  Moderate left neural foraminal narrowing.    C4-C5: Minimal anterolisthesis of C4 and C5 with slight uncovering of the intervertebral disc.  There is a superimposed 1-2 mm broad-based disc bulge, asymmetrically larger at the  left paramedian position.  Mild central canal narrowing.  Mild bilateral neural foraminal narrowing.    C5-C6: No significant disc bulge.  Moderately sized left and small right uncovertebral osteophytes.  The left uncovertebral osteophyte results in moderate narrowing at the left lateral recess.  There is mild local mass effect on the subjacent cervical cord.  No abnormal cord signal.  No other central canal narrowing.  Mild to moderate right neural foraminal narrowing is present.  There is severe left neural foraminal narrowing.    C6-C7: Tiny right and moderately sized left uncovertebral osteophytes.  No significant disc bulge.  The left-sided uncovertebral osteophyte results in moderate central canal narrowing, worse at the left lateral recess.  There is mild local mass effect on the subjacent cord.  No abnormal cord signal.  Mild to moderate right neural foraminal narrowing.  Severe left neural foraminal narrowing.    C7-T1: 4 mm disc bulge.  Small bilateral uncovertebral osteophytes.  Mild central canal narrowing.  No right foraminal narrowing.  Mild left neural foraminal narrowing.    Labs:  BMP  Lab Results   Component Value Date     09/06/2018    K 4.2 09/06/2018     09/06/2018    CO2 30 09/06/2018    BUN 14 09/06/2018    CREATININE 0.72 09/06/2018    CALCIUM 9.4 09/06/2018    ANIONGAP 5 (L) 09/06/2018    ESTGFRAFRICA >60 09/06/2018    EGFRNONAA >60 09/06/2018     Lab Results   Component Value Date    ALT 23 09/06/2018    AST 15 09/06/2018    ALKPHOS 81 09/06/2018    BILITOT 0.3 09/06/2018       Assessment:  Problem List Items Addressed This Visit        Neuro    Spondylosis of cervical region without myelopathy or radiculopathy - Primary    Relevant Orders    Ambulatory Referral to Physical/Occupational Therapy    Bilateral carpal tunnel syndrome          Treatment Plan:  64 y.o. year old female with PMH, COPD, DM II, hypothyroidism, presents to the office with neck pain.  Her neck pain has been  bothering her for the past couple of months.  She denies any prior neck surgery or injections.  Today she c/o neck pain, 8/10, constant, dull and aching.  No radicular pain or radiculopathy.  She also has numbness intermittently in both of her hands that get worse when she crochets.  She has not tried any PT for her neck pain.  She takes motrin with mild relief.  Has previously take hydrocodone 5/325mg prn with mild-moderate relief for severe pain.  On exam 5/5 strength b/l UE's.  No signs of myelopathy.  + Phalen's b/l.  Referral given for physical therapy to improve function and strength, and to receive training towards establishing a safe and effective home exercise program (HEP).  She should try to wear carpal tunnel brace in the evenings.  If still continues to have numbness in her hands, can refer to orthopedics for evaluation.  She should continue NSAIDs and I will provide with short course of hydrocodone for prn use for severe pain.  I also discussed this should not be taken with valium, and educated her on the risks of using benzo's and opioids such as respiratory depression, oversedation, and even death.  She understands and will not take the medications at the same time.  In the future I advised her that she can refill as needed with her primary care doctor as she has previously done.  If her axial neck pain fails to improve with conservative treatment, discussed that I can consider further interventions.  However it should be noted that she has multiple open lesions over her neck and shoulders, and I discussed that I am unwilling to do any intervention until they are completely healed.  Follow up in the office in 8 weeks following PT.    Procedures: none at this time  PT/OT/HEP: Referral given for physical therapy to improve function and strength, and to receive training towards establishing a safe and effective home exercise program (HEP).   Medications: copntinue motrin prn, short course hydrocodone  5/325mg po bid prn for severe pain  Labs: Reviewed and medications are appropriately dosed for current hepatorenal function.  Imaging: No additional recommended at this time.    : Reviewed and consistent with medication use as prescribed.    Yasmany Sotomayor M.D.  Interventional Pain Medicine / Anesthesiology

## 2019-04-02 NOTE — PROGRESS NOTES
Neurosurgery History and Physical    Patient ID: Rosenda Hunter is a 64 y.o. female.    Chief Complaint   Patient presents with    Neck Pain     onset 1/2019, pain starts in mid neck and radiates down back or up into head, tingling down into right hand, looses balance, No accident, Oswestry = 38, PHQ = 14       Review of Systems   Constitutional: Negative.    HENT: Negative.    Eyes: Negative.    Respiratory: Negative.    Cardiovascular: Negative.    Gastrointestinal: Negative.    Endocrine: Negative.    Genitourinary: Negative.    Musculoskeletal: Positive for back pain and neck pain.   Skin: Negative.    Allergic/Immunologic: Negative.    Neurological: Positive for numbness.   Hematological: Negative.    Psychiatric/Behavioral: Negative.        Past Medical History:   Diagnosis Date    Anxiety     Arthritis     Cellulitis of leg     COPD (chronic obstructive pulmonary disease)     Depression     Diabetes mellitus     as an adult, corrected with diet    DVT (deep venous thrombosis)     Fracture, rib     Hyperlipidemia     Hypertension     Hypothyroidism     Lumbar disc disease      Social History     Socioeconomic History    Marital status:      Spouse name: Not on file    Number of children: Not on file    Years of education: Not on file    Highest education level: Not on file   Occupational History    Not on file   Social Needs    Financial resource strain: Not on file    Food insecurity:     Worry: Not on file     Inability: Not on file    Transportation needs:     Medical: Not on file     Non-medical: Not on file   Tobacco Use    Smoking status: Current Every Day Smoker     Packs/day: 1.00    Smokeless tobacco: Never Used   Substance and Sexual Activity    Alcohol use: No    Drug use: No    Sexual activity: Yes     Partners: Male   Lifestyle    Physical activity:     Days per week: Not on file     Minutes per session: Not on file    Stress: Not on file   Relationships     Social connections:     Talks on phone: Not on file     Gets together: Not on file     Attends Anabaptist service: Not on file     Active member of club or organization: Not on file     Attends meetings of clubs or organizations: Not on file     Relationship status: Not on file    Intimate partner violence:     Fear of current or ex partner: Not on file     Emotionally abused: Not on file     Physically abused: Not on file     Forced sexual activity: Not on file   Other Topics Concern    Not on file   Social History Narrative    Not on file     Family History   Problem Relation Age of Onset    Diabetes Mother     Hypertension Mother     Hyperlipidemia Mother     Heart disease Mother     Kidney disease Mother     Breast cancer Paternal Aunt      Review of patient's allergies indicates:   Allergen Reactions    Ceclor [cefaclor] Swelling       Current Outpatient Medications:     albuterol (ACCUNEB) 1.25 mg/3 mL Nebu, Take 3 mLs (1.25 mg total) by nebulization 3 (three) times daily as needed., Disp: 75 mL, Rfl: 5    aripiprazole (ABILIFY) 5 MG Tab, Take 5 mg by mouth once daily., Disp: , Rfl:     baclofen (LIORESAL) 10 MG tablet, Take 1 tablet (10 mg total) by mouth 3 (three) times daily., Disp: 40 tablet, Rfl: 1    clindamycin (CLEOCIN HCL) 150 MG capsule, Take 1 capsule (150 mg total) by mouth 4 (four) times daily. for 10 days, Disp: 40 capsule, Rfl: 3    COMBIVENT RESPIMAT  mcg/actuation inhaler, INHALE 1 PUFF 3 TIMES DAILY SHAKE WELL RINSE MOUTH AFTER USING, Disp: 4 g, Rfl: 5    cyanocobalamin, vitamin B-12, 1,000 mcg/15 mL Liqd, Take by mouth once daily., Disp: , Rfl:     diazePAM (VALIUM) 5 MG tablet, TAKE 1 TABLET BY MOUTH ONCE DAILY AT BEDTIME AS NEEDED FOR SLEEP, Disp: 15 tablet, Rfl: 2    FLUoxetine (PROZAC) 20 MG capsule, Take 1 capsule by mouth every night, Disp: , Rfl: 1    FLUoxetine (PROZAC) 40 MG capsule, Take 1 capsule by mouth every night take with 20mg dose for total of  60mg, Disp: , Rfl: 3    HYDROcodone-acetaminophen (NORCO) 5-325 mg per tablet, Take 1 tablet by mouth every 6 (six) hours as needed for Pain., Disp: 25 tablet, Rfl: 0    levothyroxine (SYNTHROID) 50 MCG tablet, TAKE 1 TABLET BY MOUTH DAILY. TAKE ON EMPTY STOMACH, Disp: 90 tablet, Rfl: 3    LYRICA 75 mg capsule, TAKE 1 CAPSULE BY MOUTH 2 TIMES DAILY, Disp: 180 capsule, Rfl: 1  There were no vitals taken for this visit.      Neurologic Exam     Mental Status   Oriented to person, place, and time.   Attention: normal. Concentration: normal.   Speech: speech is normal   Level of consciousness: alert  Knowledge: good.     Cranial Nerves     CN II   Visual acuity: normal    CN III, IV, VI   Pupils are equal, round, and reactive to light.  Extraocular motions are normal.     CN V   Facial sensation intact.     CN VII   Facial expression full, symmetric.     CN VIII   Hearing: intact    CN IX, X   Palate: symmetric    CN XI   CN XI normal.     CN XII   CN XII normal.     Motor Exam   Muscle bulk: normal  Overall muscle tone: normal  Right arm pronator drift: absent  Left arm pronator drift: absent    Strength   Right deltoid: 5/5  Left deltoid: 5/5  Right biceps: 5/5  Left biceps: 5/5  Right triceps: 5/5  Left triceps: 5/5  Right wrist flexion: 5/5  Left wrist flexion: 5/5  Right wrist extension: 5/5  Left wrist extension: 5/5  Right interossei: 5/5  Left interossei: 5/5  Right iliopsoas: 5/5  Left iliopsoas: 5/5  Right quadriceps: 5/5  Left quadriceps: 5/5  Right hamstrin/5  Left hamstrin/5  Right anterior tibial: 5/5  Left anterior tibial: 5/5  Right posterior tibial: 5/5  Left posterior tibial: 5/5  Right peroneal: 5/5  Left peroneal: 5/5  Right gastroc: 5/5  Left gastroc: 5/5    Sensory Exam   Light touch normal.     Gait, Coordination, and Reflexes     Gait  Gait: normal    Coordination   Romberg: negative  Finger to nose coordination: normal    Tremor   Resting tremor: absent    Reflexes   Right  brachioradialis: 2+  Left brachioradialis: 2+  Right biceps: 2+  Left biceps: 2+  Right triceps: 2+  Left triceps: 2+  Right patellar: 2+  Left patellar: 2+  Right achilles: 1+  Left achilles: 1+  Right plantar: normal  Left plantar: normal  Right Beard: absent  Left Beard: absent  Right ankle clonus: absent  Left ankle clonus: absent      Physical Exam   Constitutional: She is oriented to person, place, and time.   Appears older than stated age. Unkept. Poor hygiene.   HENT:   Head: Normocephalic and atraumatic.   Eyes: Pupils are equal, round, and reactive to light. EOM are normal.   Neck: Normal range of motion. Neck supple.   Cardiovascular: Normal rate and regular rhythm.   Pulmonary/Chest: Effort normal.   Abdominal: Soft.   Musculoskeletal: Normal range of motion.   Neurological: She is alert and oriented to person, place, and time. She has a normal Finger-Nose-Finger Test and a normal Romberg Test. Gait normal.   Reflex Scores:       Tricep reflexes are 2+ on the right side and 2+ on the left side.       Bicep reflexes are 2+ on the right side and 2+ on the left side.       Brachioradialis reflexes are 2+ on the right side and 2+ on the left side.       Patellar reflexes are 2+ on the right side and 2+ on the left side.       Achilles reflexes are 1+ on the right side and 1+ on the left side.  Skin: Skin is warm and dry.   Multiple ulcerated lesions in various stages of healing throughout exposed skin   Psychiatric: She has a normal mood and affect. Her speech is normal and behavior is normal. Judgment and thought content normal.   Nursing note and vitals reviewed.       ]    Provider dictation:  I reviewed the imaging. There is multilevel cervical disc degeneration. There is no spinal cord compression. There is right-sided C3-C4 disc herniation with foraminal stenosis. There is left C5-C6 and C6-C7 foraminal stenosis. There is focal reversal of the lordosis from C5 to C7.     Previously seen for lumbar  issues. These resolved with non-surgical treatment. Now seeing me for chronic neck pain, exacerbated in the last 3 months. The pain is exacerbated by turning her head. Radiates to her head and her upper back but not to her upper extremities. She has noted some numbness in the right hand that occurs when she crochets.     Neurological exam is benign. No myelopathy or weakness.     No neurosurgical intervention indicated at this time. Recommend PT and Pain Management.     Visit Diagnosis:  Cervical disc disease    Neck pain

## 2019-04-02 NOTE — LETTER
April 2, 2019      Rafael Garcia MD  [address] [facility] [address] [place] [address]           [facility]  [address] [facility] [address] [place] [address]  Phone: [phone]  Fax: [phone]          Patient: Rosenda Hunter   MR Number: [id number]   YOB: 1955   Date of Visit: 4/2/2019       Dear Dr. Rafael Garcia:    Thank you for referring Rosenda Hunter to me for evaluation. Attached you will find relevant portions of my assessment and plan of care.    If you have questions, please do not hesitate to call me. I look forward to following Rosenda Hunter along with you.    Sincerely,    Yasmany Sotomayor MD    Enclosure  CC:  No Recipients    If you would like to receive this communication electronically, please contact [email or web address] or [phone] to request more information on [employer] access.    For providers and/or their staff who would like to refer a patient to [facility], please contact us through our one-stop-shop provider referral line, [facility] [place], at [phone].    If you feel you have received this communication in error or would no longer like to receive these types of communications, please e-mail [email or web address]

## 2019-04-04 ENCOUNTER — TELEPHONE (OUTPATIENT)
Dept: PAIN MEDICINE | Facility: CLINIC | Age: 64
End: 2019-04-04

## 2019-04-04 NOTE — TELEPHONE ENCOUNTER
----- Message from Tanika Padgett sent at 4/4/2019 11:10 AM CDT -----  Type: Needs Medical Advice    Who Called:  Patient  Best Call Back Number: [phone]  Additional Information: Needs office to send an order for physical therapy to [facility] in [place] at [address]. She did not have the fax. Please call patient when completed.

## 2019-04-04 NOTE — TELEPHONE ENCOUNTER
----- Message from Kiran Garcia sent at 4/4/2019 10:48 AM CDT -----  Contact: patient  Type: Needs Medical Advice    Who Called:  Patient  Symptoms (please be specific):    How long has patient had these symptoms:    Pharmacy name and phone #:    Best Call Back Number: [phone]  Additional Information: requesting a call back regarding physical therapy have questions?

## 2019-04-25 ENCOUNTER — NURSE TRIAGE (OUTPATIENT)
Dept: ADMINISTRATIVE | Facility: CLINIC | Age: 64
End: 2019-04-25

## 2019-04-26 NOTE — TELEPHONE ENCOUNTER
Her nerves have been on edge all day today.  She took a diazepam earlier and was able to rest.  When she got up and ate, her stomach e got upset and anxious, she is asking if she could take another diazepam.   I advised that the md had only prescribed one 5 mg diazepam nightly, and I could not advise anything different than that.      Reason for Disposition   Caller has NON-URGENT medication question about med that PCP prescribed and triager unable to answer question    Protocols used: MEDICATION QUESTION CALL-A-AH

## 2019-04-30 ENCOUNTER — NURSE TRIAGE (OUTPATIENT)
Dept: ADMINISTRATIVE | Facility: CLINIC | Age: 64
End: 2019-04-30

## 2019-04-30 NOTE — TELEPHONE ENCOUNTER
Reason for Disposition   Caller has medication question only, adult not sick, and triager answers question    Protocols used: MEDICATION QUESTION CALL-A-    Pt calling to find out if baclofen will help with headache and neck pain. Informed baclofen is muscle relaxer. Pt feels that in PT her neck was stretched to far. Has had pain since Friday. appt scheduled for today.

## 2019-05-01 ENCOUNTER — TELEPHONE (OUTPATIENT)
Dept: ADMINISTRATIVE | Facility: OTHER | Age: 64
End: 2019-05-01

## 2019-05-01 NOTE — TELEPHONE ENCOUNTER
Patient called and stated that she has had some trouble with the physical therapy. She turned her neck during one of the exercises and it cause her a lot of pain that has not subsided. She would like to know what she should do from here. She is established with pain management and was advised by Dr. Sotomayor to follow up after physical therapy.

## 2019-05-13 ENCOUNTER — OFFICE VISIT (OUTPATIENT)
Dept: PAIN MEDICINE | Facility: CLINIC | Age: 64
End: 2019-05-13
Payer: MEDICARE

## 2019-05-13 VITALS
HEIGHT: 66 IN | WEIGHT: 158.5 LBS | DIASTOLIC BLOOD PRESSURE: 68 MMHG | HEART RATE: 65 BPM | BODY MASS INDEX: 25.47 KG/M2 | SYSTOLIC BLOOD PRESSURE: 110 MMHG

## 2019-05-13 DIAGNOSIS — M79.18 MYOFASCIAL PAIN: ICD-10-CM

## 2019-05-13 DIAGNOSIS — L98.9 SKIN LESIONS: Primary | ICD-10-CM

## 2019-05-13 PROCEDURE — 3008F BODY MASS INDEX DOCD: CPT | Mod: CPTII,S$GLB,, | Performed by: ANESTHESIOLOGY

## 2019-05-13 PROCEDURE — 99212 PR OFFICE/OUTPT VISIT, EST, LEVL II, 10-19 MIN: ICD-10-PCS | Mod: S$GLB,,, | Performed by: ANESTHESIOLOGY

## 2019-05-13 PROCEDURE — 3008F PR BODY MASS INDEX (BMI) DOCUMENTED: ICD-10-PCS | Mod: CPTII,S$GLB,, | Performed by: ANESTHESIOLOGY

## 2019-05-13 PROCEDURE — 99999 PR PBB SHADOW E&M-EST. PATIENT-LVL III: ICD-10-PCS | Mod: PBBFAC,,, | Performed by: ANESTHESIOLOGY

## 2019-05-13 PROCEDURE — 99212 OFFICE O/P EST SF 10 MIN: CPT | Mod: S$GLB,,, | Performed by: ANESTHESIOLOGY

## 2019-05-13 PROCEDURE — 99999 PR PBB SHADOW E&M-EST. PATIENT-LVL III: CPT | Mod: PBBFAC,,, | Performed by: ANESTHESIOLOGY

## 2019-05-13 NOTE — LETTER
May 13, 2019      Rafael Garcia MD  [address] [facility] [place] [address]           [facility]  [address] [facility] [place] [address]  Phone: [phone]  Fax: [phone]          Patient: Rosenda Hunter   MR Number: [id number]   YOB: 1955   Date of Visit: 5/13/2019       Dear Dr. Rafael Garcia:    Thank you for referring Rosenda Hunter to me for evaluation. Attached you will find relevant portions of my assessment and plan of care.    If you have questions, please do not hesitate to call me. I look forward to following Rosenda Hunter along with you.    Sincerely,    Yasmany Sotomayor MD    Enclosure  CC:  No Recipients    If you would like to receive this communication electronically, please contact [email or web address] or [phone] to request more information on [employer] [facility] access.    For providers and/or their staff who would like to refer a patient to [facility], please contact us through our one-stop-shop provider referral line, [facility], at [phone].    If you feel you have received this communication in error or would no longer like to receive these types of communications, please e-mail [email or web address]

## 2019-05-13 NOTE — PROGRESS NOTES
[facility] Follow Up Evaluation    Referred by: Dr. Garcia  Reason for referral: neck pain    CC:   Chief Complaint   Patient presents with    Neck Pain     Pain radiates down her spine to the lower back.      Last 3 PDI Scores 4/2/2019 1/31/2018 12/19/2017   Pain Disability Index (PDI) 5 44 16     Interval HPI 5/13/19: Ms. Hunter returns to the office for follow up.  Has done PT, when she first started it worked really well. Improved ROM with neck flexion and extension. PT 2 weeks ago she felt like it made her worse.  Today her pain is 5/10, constant, aching in her b/l neck. She reports pain is only in her neck.  She continues to have excoriations and lesions over her arms, neck, and back.  She reports fleas at home may be contributing.      HPI:   Rosenda Hunter is a 64 y.o. female who complains of neck pain    Onset: a couple months  Progression: since onset, pain is stable  Current Pain Score: 8/10  Typical Range: 6-8/10  Timing: constant  Quality: Aching and Dull  Radiation: no  Associated numbness or weakness: yes numbness, tingling right hand  Exacerbated by: nothing in particular  Allievated by: nothing  Is Pain Level Acceptable?: No    Previous Therapies:  PT/OT: yes in the past for the lumbar spine  HEP:   Interventions:   Previous:  11/21/17 -  Left L4-5, L5-S1 TFESI  Surgery:  Medications:   - NSAIDS:   - MSK Relaxants:   - TCAs:   - SNRIs:   - Topicals:   - Anticonvulsants:  - Opioids: hydrocodone     Current Pain Medications:  1. Lyrica 75mg BID    History:    Current Outpatient Medications:     albuterol (ACCUNEB) 1.25 mg/3 mL Nebu, Take 3 mLs (1.25 mg total) by nebulization 3 (three) times daily as needed., Disp: 75 mL, Rfl: 5    aripiprazole (ABILIFY) 5 MG Tab, Take 5 mg by mouth once daily., Disp: , Rfl:     COMBIVENT RESPIMAT  mcg/actuation inhaler, INHALE 1 PUFF 3 TIMES DAILY SHAKE WELL RINSE MOUTH AFTER USING, Disp: 4 g, Rfl: 5    cyanocobalamin, vitamin  B-12, 1,000 mcg/15 mL Liqd, Take by mouth once daily., Disp: , Rfl:     diazePAM (VALIUM) 5 MG tablet, TAKE 1 TABLET BY MOUTH ONCE DAILY AT BEDTIME AS NEEDED FOR SLEEP, Disp: 15 tablet, Rfl: 2    FLUoxetine (PROZAC) 20 MG capsule, Take 1 capsule by mouth every night, Disp: , Rfl: 1    FLUoxetine (PROZAC) 40 MG capsule, Take 1 capsule by mouth every night take with 20mg dose for total of 60mg, Disp: , Rfl: 3    HYDROcodone-acetaminophen (NORCO) 5-325 mg per tablet, Take 1 tablet by mouth every 12 (twelve) hours as needed for Pain., Disp: 14 tablet, Rfl: 0    levothyroxine (SYNTHROID) 50 MCG tablet, TAKE 1 TABLET BY MOUTH DAILY. TAKE ON EMPTY STOMACH, Disp: 90 tablet, Rfl: 3    LYRICA 75 mg capsule, TAKE 1 CAPSULE BY MOUTH 2 TIMES DAILY, Disp: 180 capsule, Rfl: 1    tiZANidine (ZANAFLEX) 4 MG tablet, 4 mg po q hs prn muscle spasm, Disp: 30 tablet, Rfl: 1    Past Medical History:   Diagnosis Date    Anxiety     Arthritis     Cellulitis of leg     COPD (chronic obstructive pulmonary disease)     Depression     Diabetes mellitus     as an adult, corrected with diet    DVT (deep venous thrombosis)     Fracture, rib     Hyperlipidemia     Hypertension     Hypothyroidism     Lumbar disc disease        Past Surgical History:   Procedure Laterality Date    DENTAL SURGERY      hien      HIEN-TRANSFORAMINAL L4/5 and L5/S1 Left 11/21/2017    Performed by Robert [facility]par Jr[facility] at [facility]       Family History   Problem Relation Age of Onset    Diabetes Mother     Hypertension Mother     Hyperlipidemia Mother     Heart disease Mother     Kidney disease Mother     Breast cancer Paternal Aunt        Social History     Socioeconomic History    Marital status:      Spouse name: Not on file    Number of children: Not on file    Years of education: Not on file    Highest education level: Not on file   Occupational History    Not on file   Social Needs    Financial resource strain: Not on file  "   Food insecurity:     Worry: Not on file     Inability: Not on file    Transportation needs:     Medical: Not on file     Non-medical: Not on file   Tobacco Use    Smoking status: Current Every Day Smoker     Packs/day: 1.00    Smokeless tobacco: Never Used   Substance and Sexual Activity    Alcohol use: No    Drug use: No    Sexual activity: Yes     Partners: Male   Lifestyle    Physical activity:     Days per week: Not on file     Minutes per session: Not on file    Stress: Not on file   Relationships    Social connections:     Talks on phone: Not on file     Gets together: Not on file     Attends Alevism service: Not on file     Active member of club or organization: Not on file     Attends meetings of clubs or organizations: Not on file     Relationship status: Not on file   Other Topics Concern    Not on file   Social History Narrative    Not on file       Review of patient's allergies indicates:   Allergen Reactions    Ceclor [cefaclor] Swelling       Review of Systems:  There have been no changes since the patient was last seen by me in the office on 4/2/19    Physical Exam:  Vitals:    05/13/19 1307   BP: 110/68   Pulse: 65   Weight: 71.9 kg (158 lb 8.2 oz)   Height: 5' 6" (1.676 m)   PainSc:   5   PainLoc: Neck     Body mass index is 25.58 kg/m².     Gen: NAD  Psych: mood appropriate for given condition  CV: RRR  HEENT: anicteric   Respiratory: non labored  Abd: soft nt, nd  Skin: intact, multiple excoriations and lesions over her neck, shoulders, and some on b/l arms  Reflexes: 2+ b/l Bicep, tricep, BR and patella Beard negative  ROM: Cervical ROM full, shoulder, elbow and wrist ROM full, no scapular dysmotility     Imaging:  MRI cervical 2/19/19  FINDINGS:  There is loss of normal lordotic curvature with mild kyphosis centered at the C6 vertebral body level.  There is mild levo scoliotic curvature centered at the C5 vertebral body level.  There is 3 mm anterolisthesis of C4 on C5.  No " other malalignment.  Mild degenerative endplate edema is present at C7-T1.  Otherwise, bone marrow signal throughout the cervical spine is within normal limits.  Craniocervical junction appears normal.  Unremarkable paraspinal soft tissues.    C2-C3: No central canal or foraminal narrowing.    C3-C4: 3 mm disc bulge with mild bilateral hypertrophic facet changes.  Mild to moderate central canal stenosis, worst at the right lateral recess.  Moderate to severe right neural foraminal narrowing.  Moderate left neural foraminal narrowing.    C4-C5: Minimal anterolisthesis of C4 and C5 with slight uncovering of the intervertebral disc.  There is a superimposed 1-2 mm broad-based disc bulge, asymmetrically larger at the left paramedian position.  Mild central canal narrowing.  Mild bilateral neural foraminal narrowing.    C5-C6: No significant disc bulge.  Moderately sized left and small right uncovertebral osteophytes.  The left uncovertebral osteophyte results in moderate narrowing at the left lateral recess.  There is mild local mass effect on the subjacent cervical cord.  No abnormal cord signal.  No other central canal narrowing.  Mild to moderate right neural foraminal narrowing is present.  There is severe left neural foraminal narrowing.    C6-C7: Tiny right and moderately sized left uncovertebral osteophytes.  No significant disc bulge.  The left-sided uncovertebral osteophyte results in moderate central canal narrowing, worse at the left lateral recess.  There is mild local mass effect on the subjacent cord.  No abnormal cord signal.  Mild to moderate right neural foraminal narrowing.  Severe left neural foraminal narrowing.    C7-T1: 4 mm disc bulge.  Small bilateral uncovertebral osteophytes.  Mild central canal narrowing.  No right foraminal narrowing.  Mild left neural foraminal narrowing.    Labs:  BMP  Lab Results   Component Value Date     09/06/2018    K 4.2 09/06/2018     09/06/2018    CO2 30  09/06/2018    BUN 14 09/06/2018    CREATININE 0.72 09/06/2018    CALCIUM 9.4 09/06/2018    ANIONGAP 5 (L) 09/06/2018    ESTGFRAFRICA >60 09/06/2018    EGFRNONAA >60 09/06/2018     Lab Results   Component Value Date    ALT 23 09/06/2018    AST 15 09/06/2018    ALKPHOS 81 09/06/2018    BILITOT 0.3 09/06/2018       Assessment:  Problem List Items Addressed This Visit        Orthopedic    Myofascial pain      Other Visit Diagnoses     Skin lesions    -  Primary    Relevant Orders    Ambulatory Referral to Dermatology          Treatment Plan:  64 y.o. year old female with PMH, COPD, DM II, hypothyroidism, presents to the office with neck pain.  Her neck pain has been bothering her for the past couple of months.  She denies any prior neck surgery or injections.  Today she c/o neck pain, 8/10, constant, dull and aching.  No radicular pain or radiculopathy.  She also has numbness intermittently in both of her hands that get worse when she crochets.  She has not tried any PT for her neck pain.  She takes motrin with mild relief.  Has previously take hydrocodone 5/325mg prn with mild-moderate relief for severe pain.  On exam 5/5 strength b/l UE's.  No signs of myelopathy.  + Phalen's b/l.  Referral given for physical therapy to improve function and strength, and to receive training towards establishing a safe and effective home exercise program (HEP).  She should try to wear carpal tunnel brace in the evenings.  If still continues to have numbness in her hands, can refer to orthopedics for evaluation.  She should continue NSAIDs and I will provide with short course of hydrocodone for prn use for severe pain.  I also discussed this should not be taken with valium, and educated her on the risks of using benzo's and opioids such as respiratory depression, oversedation, and even death.  She understands and will not take the medications at the same time.  In the future I advised her that she can refill as needed with her primary  care doctor as she has previously done.  If her axial neck pain fails to improve with conservative treatment, discussed that I can consider further interventions.  However it should be noted that she has multiple open lesions over her neck and shoulders, and I discussed that I am unwilling to do any intervention until they are completely healed.  Follow up in the office in 8 weeks following PT.     5/13/19 - Ms. Hunter returns to the office for follow up.  Has done PT, when she first started it worked really well. Improved ROM with neck flexion and extension. PT 2 weeks ago she felt like it made her worse.  Today her pain is 5/10, constant, aching in her b/l neck. She reports pain is only in her neck.  She continues to have excoriations and lesions over her arms, neck, and back.  She reports fleas at home may be contributing.  At this time I recommend that she continue PT and continue tizanidine as prescribed.   I can do TPI, however she needs to see derm first as the lesion on her neck and back are still present and I am concerned about infection.  Referral to derm has been placed.  She can follow up in 2-3 months after continued conservative care and derm consultation.     Procedures: none at this time  PT/OT/HEP: Referral given for physical therapy to improve function and strength, and to receive training towards establishing a safe and effective home exercise program (HEP).   Medications: continue current medications as prescribed  Labs: Reviewed and medications are appropriately dosed for current hepatorenal function.  Imaging: No additional recommended at this time.    : Reviewed and consistent with medication use as prescribed.    Yasmany Sotomayor M.D.  Interventional Pain Medicine / Anesthesiology

## 2019-05-22 ENCOUNTER — INITIAL CONSULT (OUTPATIENT)
Dept: DERMATOLOGY | Facility: CLINIC | Age: 64
End: 2019-05-22
Payer: MEDICARE

## 2019-05-22 VITALS — RESPIRATION RATE: 18 BRPM | BODY MASS INDEX: 25.47 KG/M2 | WEIGHT: 158.5 LBS | HEIGHT: 66 IN

## 2019-05-22 DIAGNOSIS — T07.XXXA MULTIPLE EXCORIATIONS: Primary | ICD-10-CM

## 2019-05-22 PROCEDURE — 99999 PR PBB SHADOW E&M-EST. PATIENT-LVL III: CPT | Mod: PBBFAC,,, | Performed by: DERMATOLOGY

## 2019-05-22 PROCEDURE — 99203 OFFICE O/P NEW LOW 30 MIN: CPT | Mod: S$GLB,,, | Performed by: DERMATOLOGY

## 2019-05-22 PROCEDURE — 99203 PR OFFICE/OUTPT VISIT, NEW, LEVL III, 30-44 MIN: ICD-10-PCS | Mod: S$GLB,,, | Performed by: DERMATOLOGY

## 2019-05-22 PROCEDURE — 3008F BODY MASS INDEX DOCD: CPT | Mod: CPTII,S$GLB,, | Performed by: DERMATOLOGY

## 2019-05-22 PROCEDURE — 3008F PR BODY MASS INDEX (BMI) DOCUMENTED: ICD-10-PCS | Mod: CPTII,S$GLB,, | Performed by: DERMATOLOGY

## 2019-05-22 PROCEDURE — 99999 PR PBB SHADOW E&M-EST. PATIENT-LVL III: ICD-10-PCS | Mod: PBBFAC,,, | Performed by: DERMATOLOGY

## 2019-05-22 RX ORDER — TRIAMCINOLONE ACETONIDE 1 MG/G
CREAM TOPICAL
Qty: 60 G | Refills: 3 | Status: SHIPPED | OUTPATIENT
Start: 2019-05-22 | End: 2019-09-26

## 2019-05-22 RX ORDER — MUPIROCIN CALCIUM 20 MG/G
CREAM TOPICAL
Qty: 30 G | Refills: 3 | Status: SHIPPED | OUTPATIENT
Start: 2019-05-22 | End: 2019-09-26

## 2019-05-22 NOTE — LETTER
May 22, 2019      Yasmany Sotomayor MD  [address] [facility] [address] [place] [address]           [facility]  [address] [facility] [address] [place] [address]  Phone: [phone]  Fax: [phone]          Patient: Rosenda Hunter   MR Number: [id number]   YOB: 1955   Date of Visit: 5/22/2019       Dear Dr. Yasmany Sotomayor:    Thank you for referring Rosenda Hunter to me for evaluation. Attached you will find relevant portions of my assessment and plan of care.    If you have questions, please do not hesitate to call me. I look forward to following Rosenda Hunter along with you.    Sincerely,    Deysi Beltre MD    Enclosure  CC:  No Recipients    If you would like to receive this communication electronically, please contact [email or web address] or [phone] to request more information on [employer] Link access.    For providers and/or their staff who would like to refer a patient to [facility], please contact us through our one-stop-shop provider referral line, [facility], at [phone].    If you feel you have received this communication in error or would no longer like to receive these types of communications, please e-mail [email or web address]

## 2019-05-22 NOTE — PROGRESS NOTES
"  Subjective:       Patient ID:  Rosenda Hunter is a 64 y.o. female who presents for   Chief Complaint   Patient presents with    Lesion     Present for initial visit with her mother  C/o lesions to arms, legs, back, and buttocks x many years ("states entire life, since grade school"). Stated they appear as scratches and then progress to sores after picking. Tx with athlete feet cream. States some lesions are from a flea bite and some from excoriations from her cat.    passed away about 1.5 years ago and rash worsened.     Previous treated with phototherapy years ago.     No phx of NMSC  No fhx of melanoma    Past Medical History:  No date: Anxiety  No date: Arthritis  No date: Cellulitis of leg  No date: COPD (chronic obstructive pulmonary disease)  No date: Depression  No date: Diabetes mellitus      Comment:  as an adult, corrected with diet  No date: DVT (deep venous thrombosis)  No date: Fracture, rib  No date: Hyperlipidemia  No date: Hypertension  No date: Hypothyroidism  No date: Lumbar disc disease        Review of Systems   Constitutional: Negative for fever and chills.   HENT: Negative for sore throat.    Respiratory: Negative for cough.    Gastrointestinal: Negative for nausea and vomiting.   Skin: Positive for dry skin and activity-related sunscreen use. Negative for itching and rash.        Objective:    Physical Exam   Constitutional: She appears well-developed and well-nourished. No distress.   Neurological: She is alert and oriented to person, place, and time. She is not disoriented.   Psychiatric: She has a normal mood and affect.   Skin:   Areas Examined (abnormalities noted in diagram):   Scalp / Hair Palpated and Inspected  Head / Face Inspection Performed  Neck Inspection Performed  Chest / Axilla Inspection Performed  Abdomen Inspection Performed  Genitals / Buttocks / Groin Inspection Performed  Back Inspection Performed  RUE Inspected  LUE Inspection Performed  RLE " Inspected  LLE Inspection Performed  Nails and Digits Inspection Performed                   Diagram Legend     Erythematous scaling macule/papule c/w actinic keratosis       Vascular papule c/w angioma      Pigmented verrucoid papule/plaque c/w seborrheic keratosis      Yellow umbilicated papule c/w sebaceous hyperplasia      Irregularly shaped tan macule c/w lentigo     1-2 mm smooth white papules consistent with Milia      Movable subcutaneous cyst with punctum c/w epidermal inclusion cyst      Subcutaneous movable cyst c/w pilar cyst      Firm pink to brown papule c/w dermatofibroma      Pedunculated fleshy papule(s) c/w skin tag(s)      Evenly pigmented macule c/w junctional nevus     Mildly variegated pigmented, slightly irregular-bordered macule c/w mildly atypical nevus      Flesh colored to evenly pigmented papule c/w intradermal nevus       Pink pearly papule/plaque c/w basal cell carcinoma      Erythematous hyperkeratotic cursted plaque c/w SCC      Surgical scar with no sign of skin cancer recurrence      Open and closed comedones      Inflammatory papules and pustules      Verrucoid papule consistent consistent with wart     Erythematous eczematous patches and plaques     Dystrophic onycholytic nail with subungual debris c/w onychomycosis     Umbilicated papule    Erythematous-base heme-crusted tan verrucoid plaque consistent with inflamed seborrheic keratosis     Erythematous Silvery Scaling Plaque c/w Psoriasis     See annotation      Assessment / Plan:        Multiple excoriations  -     triamcinolone acetonide 0.1% (KENALOG) 0.1 % cream; AAA bid  Dispense: 60 g; Refill: 3  -     mupirocin calcium 2% (BACTROBAN) 2 % cream; AAA bid  Dispense: 30 g; Refill: 3       Prurigo nodularis  - We recommended a mild soap and to avoid anti-bacterial soaps which may be too irritating.   - The patient should also use fragrance-free, color-free laundry detergents and avoid dryer sheets which can be irritating.   -  The patient's skin should be well-moisturized, using a recommended moisturizer multiple times a day as needed.  - I prescribed TMC 0.1% cream to be applied twice daily to affected areas of pruritus for two weeks and then tapered to weekends only. Side effects of topical steroids were reviewed with the patient including skin atrophy, striae, telangectasias and tachyphylaxis.   - I prescribed mupirocin for lesion on right forearm and left lower leg which have mild erythema but no active infection.   - Recommend N-acetylcysteine (available over-the-counter or on [employer])   o 600 mg daily for one week  o then 600 mg twice daily for one week  o then 1200 mg every morning and 600 mg at night for one week  o then 1200 mg twice daily  o Stop at the lowest effective dose. If no improvement in one year, you may stop the medication  - I recommended an over-the-counter antihistamine such as cetirizine (Zyrtec) 10 mg daily or fexofenadine (Allegra) 180 mg daily.  - Recommend she discuss with psychiatry about possibly increasing her lyrica as this can help with pruritus. Also TCA can help with pruritus.     About 30 min spent with pt discussing treatment.     Follow up if symptoms worsen or fail to improve.

## 2019-05-22 NOTE — PATIENT INSTRUCTIONS
- Please take N-acetylcysteine (available over-the-counter or on [employer])   o 600 mg daily for one week  o then 600 mg twice daily for one week  o then 1200 mg every morning and 600 mg at night for one week  o then 1200 mg twice daily  - You may stop at the lowest effective dose. If no i

## 2019-06-17 ENCOUNTER — TELEPHONE (OUTPATIENT)
Dept: PAIN MEDICINE | Facility: CLINIC | Age: 64
End: 2019-06-17

## 2019-06-17 NOTE — TELEPHONE ENCOUNTER
----- Message from Johanna Higginbotham sent at 6/17/2019 12:47 PM CDT -----  Contact: Patient  Type: Needs Medical Advice    Who Called:  Patient  Best Call Back Number: [phone] (home)   Additional Information: patient said she will like an appt with the Dr, she is in pain she was trying to do it at home but she can't said she needs a morning appointment in [place], she don' t have gas money to go anywhere else please call her to advis

## 2019-06-18 NOTE — TELEPHONE ENCOUNTER
----- Message from Carmen Atwood sent at 6/18/2019  9:29 AM CDT -----  Contact: Patient  Type:  Patient Returning Call    Who Called:  Patient  Who Left Message for Patient:  Amber  Does the patient know what this is regarding?: pain in head and neck  Best Call Back Number: [phone]

## 2019-06-18 NOTE — TELEPHONE ENCOUNTER
Sent physical therapy orders to [facility] as patient needs to go to another place. Also she wants to know if she can take her muscle relaxer more than once a day.

## 2019-06-24 ENCOUNTER — TELEPHONE (OUTPATIENT)
Dept: PAIN MEDICINE | Facility: CLINIC | Age: 64
End: 2019-06-24

## 2019-06-24 NOTE — TELEPHONE ENCOUNTER
----- Message from Petra Lewis sent at 6/24/2019  8:37 AM CDT -----  Contact: self   Patient called to check status of order for physical therapy to [facility], she states they have not received order. Please call back at [phone] (home) asap

## 2019-06-24 NOTE — TELEPHONE ENCOUNTER
----- Message from Johanna Madsen sent at 6/24/2019  6:36 AM CDT -----  Contact: Pt  Pt is requesting a callback from office need to check on where the referral was sent for Physical Therapy    Pt is requesting a callback at [phone]

## 2019-06-25 NOTE — TELEPHONE ENCOUNTER
----- Message from Raffy Goldsmith sent at 6/24/2019  4:20 PM CDT -----  Contact: self   Patient want to know if Doctor still want her to take physical therapy or not please call back at [phone] (home)

## 2019-08-20 ENCOUNTER — TELEPHONE (OUTPATIENT)
Dept: PODIATRY | Facility: CLINIC | Age: 64
End: 2019-08-20

## 2019-08-20 NOTE — TELEPHONE ENCOUNTER
----- Message from Tanika Padgett sent at 8/20/2019  9:45 AM CDT -----  Type: Needs Medical Advice    Who Called:  Patient  Best Call Back Number: [phone]  Additional Information: States she just finished therapy and wants to know if the doctor wants her to come back in for an appointment. Please call to advise.

## 2019-09-12 ENCOUNTER — OFFICE VISIT (OUTPATIENT)
Dept: PAIN MEDICINE | Facility: CLINIC | Age: 64
End: 2019-09-12
Payer: MEDICARE

## 2019-09-12 VITALS
OXYGEN SATURATION: 95 % | BODY MASS INDEX: 26.99 KG/M2 | TEMPERATURE: 97 F | HEART RATE: 89 BPM | DIASTOLIC BLOOD PRESSURE: 56 MMHG | RESPIRATION RATE: 18 BRPM | WEIGHT: 167.25 LBS | SYSTOLIC BLOOD PRESSURE: 119 MMHG

## 2019-09-12 DIAGNOSIS — M54.2 NECK PAIN: Primary | ICD-10-CM

## 2019-09-12 PROCEDURE — 99999 PR PBB SHADOW E&M-EST. PATIENT-LVL III: CPT | Mod: PBBFAC,,, | Performed by: ANESTHESIOLOGY

## 2019-09-12 PROCEDURE — 3008F BODY MASS INDEX DOCD: CPT | Mod: CPTII,S$GLB,, | Performed by: ANESTHESIOLOGY

## 2019-09-12 PROCEDURE — 3008F PR BODY MASS INDEX (BMI) DOCUMENTED: ICD-10-PCS | Mod: CPTII,S$GLB,, | Performed by: ANESTHESIOLOGY

## 2019-09-12 PROCEDURE — 99212 OFFICE O/P EST SF 10 MIN: CPT | Mod: S$GLB,,, | Performed by: ANESTHESIOLOGY

## 2019-09-12 PROCEDURE — 99212 PR OFFICE/OUTPT VISIT, EST, LEVL II, 10-19 MIN: ICD-10-PCS | Mod: S$GLB,,, | Performed by: ANESTHESIOLOGY

## 2019-09-12 PROCEDURE — 99999 PR PBB SHADOW E&M-EST. PATIENT-LVL III: ICD-10-PCS | Mod: PBBFAC,,, | Performed by: ANESTHESIOLOGY

## 2019-09-12 NOTE — PROGRESS NOTES
[facility] Follow Up Evaluation    Referred by: Dr. Garcia  Reason for referral: neck pain    CC:   Chief Complaint   Patient presents with    Follow-up     physical therapy      Last 3 PDI Scores 9/12/2019 4/2/2019 1/31/2018   Pain Disability Index (PDI) 16 5 44     Interval HPI 9/12/19: Ms. Hunter returns to the office for follow up.  She continues to have some neck pain into her b/l shoulders, 3/10. No numbness, no weakness.  She says she may have exacerbated it as her mother dropped a mattress on her by accident last week.  She continues lyrica, heat, and ice with mild relief.     Interval HPI 5/13/19: Ms. Hunter returns to the office for follow up.  Has done PT, when she first started it worked really well. Improved ROM with neck flexion and extension. PT 2 weeks ago she felt like it made her worse.  Today her pain is 5/10, constant, aching in her b/l neck. She reports pain is only in her neck.  She continues to have excoriations and lesions over her arms, neck, and back.  She reports fleas at home may be contributing.      HPI:   Rosenda Hunter is a 64 y.o. female who complains of neck pain    Onset: a couple months  Progression: since onset, pain is stable  Current Pain Score: 8/10  Typical Range: 6-8/10  Timing: constant  Quality: Aching and Dull  Radiation: no  Associated numbness or weakness: yes numbness, tingling right hand  Exacerbated by: nothing in particular  Allievated by: nothing  Is Pain Level Acceptable?: No    Previous Therapies:  PT/OT: yes in the past for the lumbar spine  HEP:   Interventions:   Previous:  11/21/17 -  Left L4-5, L5-S1 TFESI  Surgery:  Medications:   - NSAIDS:   - MSK Relaxants:   - TCAs:   - SNRIs:   - Topicals:   - Anticonvulsants:  - Opioids: hydrocodone     Current Pain Medications:  1. Lyrica 75mg BID    History:    Current Outpatient Medications:     albuterol (ACCUNEB) 1.25 mg/3 mL Nebu, USE 1 AMPULE PER NEBULIZER IN THE MORNING, AT  NOON, AND EVENING AS NEEDED, Disp: 75 mL, Rfl: 5    aripiprazole (ABILIFY) 5 MG Tab, Take 5 mg by mouth once daily., Disp: , Rfl:     COMBIVENT RESPIMAT  mcg/actuation inhaler, INHALE 1 PUFF 3 TIMES DAILY SHAKE WELL RINSE MOUTH AFTER USING, Disp: 4 g, Rfl: 5    cyanocobalamin, vitamin B-12, 1,000 mcg/15 mL Liqd, Take by mouth once daily., Disp: , Rfl:     diazePAM (VALIUM) 5 MG tablet, TAKE 1 TABLET BY MOUTH ONCE DAILY AT BEDTIME AS NEEDED FOR SLEEP, Disp: 15 tablet, Rfl: 2    FLUoxetine (PROZAC) 20 MG capsule, Take 1 capsule by mouth every night, Disp: , Rfl: 1    FLUoxetine (PROZAC) 40 MG capsule, Take 1 capsule by mouth every night take with 20mg dose for total of 60mg, Disp: , Rfl: 3    HYDROcodone-acetaminophen (NORCO) 5-325 mg per tablet, Take 1 tablet by mouth every 12 (twelve) hours as needed for Pain., Disp: 14 tablet, Rfl: 0    levothyroxine (SYNTHROID) 50 MCG tablet, TAKE 1 TABLET BY MOUTH DAILY. TAKE ON EMPTY STOMACH, Disp: 90 tablet, Rfl: 3    LYRICA 75 mg capsule, TAKE 1 CAPSULE BY MOUTH 2 TIMES DAILY, Disp: 180 capsule, Rfl: 1    methylPREDNISolone (MEDROL DOSEPACK) 4 mg tablet, use as directed, Disp: 1 Package, Rfl: 0    mupirocin calcium 2% (BACTROBAN) 2 % cream, AAA bid, Disp: 30 g, Rfl: 3    tiZANidine (ZANAFLEX) 4 MG tablet, 4 mg po q hs prn muscle spasm, Disp: 30 tablet, Rfl: 1    triamcinolone acetonide 0.1% (KENALOG) 0.1 % cream, AAA bid, Disp: 60 g, Rfl: 3    Past Medical History:   Diagnosis Date    Anxiety     Arthritis     Cellulitis of leg     COPD (chronic obstructive pulmonary disease)     Depression     Diabetes mellitus     as an adult, corrected with diet    DVT (deep venous thrombosis)     Fracture, rib     Hyperlipidemia     Hypertension     Hypothyroidism     Lumbar disc disease        Past Surgical History:   Procedure Laterality Date    DENTAL SURGERY      hien      HIEN-TRANSFORAMINAL L4/5 and L5/S1 Left 11/21/2017    Performed by Robert JOHNSON  Chasity Thurman MD at [facility]       Family History   Problem Relation Age of Onset    Diabetes Mother     Hypertension Mother     Hyperlipidemia Mother     Heart disease Mother     Kidney disease Mother     Breast cancer Paternal Aunt        Social History     Socioeconomic History    Marital status:      Spouse name: Not on file    Number of children: Not on file    Years of education: Not on file    Highest education level: Not on file   Occupational History    Not on file   Social Needs    Financial resource strain: Not on file    Food insecurity:     Worry: Not on file     Inability: Not on file    Transportation needs:     Medical: Not on file     Non-medical: Not on file   Tobacco Use    Smoking status: Current Every Day Smoker     Packs/day: 1.00    Smokeless tobacco: Never Used   Substance and Sexual Activity    Alcohol use: No    Drug use: No    Sexual activity: Yes     Partners: Male   Lifestyle    Physical activity:     Days per week: Not on file     Minutes per session: Not on file    Stress: Not on file   Relationships    Social connections:     Talks on phone: Not on file     Gets together: Not on file     Attends Shinto service: Not on file     Active member of club or organization: Not on file     Attends meetings of clubs or organizations: Not on file     Relationship status: Not on file   Other Topics Concern    Are you pregnant or think you may be? Not Asked    Breast-feeding Not Asked   Social History Narrative    Not on file       Review of patient's allergies indicates:   Allergen Reactions    Ceclor [cefaclor] Swelling       Review of Systems:  There have been no changes since the patient was last seen by me in the office on 5/13/19 except for + fever/chills    Physical Exam:  Vitals:    09/12/19 1420   BP: (!) 119/56   Pulse: 89   Resp: 18   Temp: 97 °F (36.1 °C)   TempSrc: Oral   SpO2: 95%   Weight: 75.8 kg (167 lb 3.5 oz)   PainSc:   6   PainLoc: Neck      Body mass index is 26.99 kg/m².     Gen: NAD  Psych: mood appropriate for given condition  CV: RRR  HEENT: anicteric   Respiratory: non labored  Abd: soft nt, nd  Skin: intact, multiple excoriations and lesions over her neck, shoulders, and some on b/l arms currently healing with some scars  ROM: Cervical ROM full, shoulder, elbow and wrist ROM full, no scapular dysmotility     Imaging:  MRI cervical 2/19/19  FINDINGS:  There is loss of normal lordotic curvature with mild kyphosis centered at the C6 vertebral body level.  There is mild levo scoliotic curvature centered at the C5 vertebral body level.  There is 3 mm anterolisthesis of C4 on C5.  No other malalignment.  Mild degenerative endplate edema is present at C7-T1.  Otherwise, bone marrow signal throughout the cervical spine is within normal limits.  Craniocervical junction appears normal.  Unremarkable paraspinal soft tissues.    C2-C3: No central canal or foraminal narrowing.  C3-C4: 3 mm disc bulge with mild bilateral hypertrophic facet changes.  Mild to moderate central canal stenosis, worst at the right lateral recess.  Moderate to severe right neural foraminal narrowing.  Moderate left neural foraminal narrowing.  C4-C5: Minimal anterolisthesis of C4 and C5 with slight uncovering of the intervertebral disc.  There is a superimposed 1-2 mm broad-based disc bulge, asymmetrically larger at the left paramedian position.  Mild central canal narrowing.  Mild bilateral neural foraminal narrowing.  C5-C6: No significant disc bulge.  Moderately sized left and small right uncovertebral osteophytes.  The left uncovertebral osteophyte results in moderate narrowing at the left lateral recess.  There is mild local mass effect on the subjacent cervical cord.  No abnormal cord signal.  No other central canal narrowing.  Mild to moderate right neural foraminal narrowing is present.  There is severe left neural foraminal narrowing.  C6-C7: Tiny right and moderately  sized left uncovertebral osteophytes.  No significant disc bulge.  The left-sided uncovertebral osteophyte results in moderate central canal narrowing, worse at the left lateral recess.  There is mild local mass effect on the subjacent cord.  No abnormal cord signal.  Mild to moderate right neural foraminal narrowing.  Severe left neural foraminal narrowing.  C7-T1: 4 mm disc bulge.  Small bilateral uncovertebral osteophytes.  Mild central canal narrowing.  No right foraminal narrowing.  Mild left neural foraminal narrowing.    Labs:  BMP  Lab Results   Component Value Date     09/06/2018    K 4.2 09/06/2018     09/06/2018    CO2 30 09/06/2018    BUN 14 09/06/2018    CREATININE 0.72 09/06/2018    CALCIUM 9.4 09/06/2018    ANIONGAP 5 (L) 09/06/2018    ESTGFRAFRICA >60 09/06/2018    EGFRNONAA >60 09/06/2018     Lab Results   Component Value Date    ALT 23 09/06/2018    AST 15 09/06/2018    ALKPHOS 81 09/06/2018    BILITOT 0.3 09/06/2018       Assessment:  Problem List Items Addressed This Visit        Orthopedic    Neck pain - Primary          Treatment Plan:  64 y.o. year old female with PMH, COPD, DM II, hypothyroidism, presents to the office with neck pain.  Her neck pain has been bothering her for the past couple of months.  She denies any prior neck surgery or injections.  Today she c/o neck pain, 8/10, constant, dull and aching.  No radicular pain or radiculopathy.  She also has numbness intermittently in both of her hands that get worse when she crochets.  She has not tried any PT for her neck pain.  She takes motrin with mild relief.  Has previously take hydrocodone 5/325mg prn with mild-moderate relief for severe pain.  On exam 5/5 strength b/l UE's.  No signs of myelopathy.  + Phalen's b/l.  Referral given for physical therapy to improve function and strength, and to receive training towards establishing a safe and effective home exercise program (HEP).  She should try to wear carpal tunnel brace  in the evenings.  If still continues to have numbness in her hands, can refer to orthopedics for evaluation.  She should continue NSAIDs and I will provide with short course of hydrocodone for prn use for severe pain.  I also discussed this should not be taken with valium, and educated her on the risks of using benzo's and opioids such as respiratory depression, oversedation, and even death.  She understands and will not take the medications at the same time.  In the future I advised her that she can refill as needed with her primary care doctor as she has previously done.  If her axial neck pain fails to improve with conservative treatment, discussed that I can consider further interventions.  However it should be noted that she has multiple open lesions over her neck and shoulders, and I discussed that I am unwilling to do any intervention until they are completely healed.  Follow up in the office in 8 weeks following PT.     5/13/19 - Ms. Hunter returns to the office for follow up.  Has done PT, when she first started it worked really well. Improved ROM with neck flexion and extension. PT 2 weeks ago she felt like it made her worse.  Today her pain is 5/10, constant, aching in her b/l neck. She reports pain is only in her neck.  She continues to have excoriations and lesions over her arms, neck, and back.  She reports fleas at home may be contributing.  At this time I recommend that she continue PT and continue tizanidine as prescribed.   I can do TPI, however she needs to see derm first as the lesion on her neck and back are still present and I am concerned about infection.  Referral to derm has been placed.  She can follow up in 2-3 months after continued conservative care and derm consultation.     9/12/19 - Ms. Hunter returns to the office for follow up.  She continues to have some neck pain into her b/l shoulders, 3/10.  No numbness, no weakness.  She says she may have exacerbated it as her  mother dropped a mattress on her by accident last week.  No bowel/bladder dysfunction, no gait instability.  She continues lyrica, heat, and ice with mild relief.  She stopped taking tizanidine as she said she would not be able to urinate when taking that medication.  She has no problems voiding when not taking that medication.  She reports recent low grade fever and chills that started a day or two ago.  She has also started medrol dose pack yesterday.  At this time I don't recommend any interventions given her fever and recent steroid intake.  She also says her pain is manageable with HEP and heat/ice.  She will follow up if her pain worsens and gets to the point where it interferes with her mobility and ADL's and we can consider cervical CELI.    Procedures: none at this time  PT/OT/HEP: continue PT and HEP  Medications: continue current medications as prescribed  Labs: Reviewed and medications are appropriately dosed for current hepatorenal function.  Imaging: No additional recommended at this time.    : Reviewed and consistent with medication use as prescribed.    Yasmany Sotomayor M.D.  Interventional Pain Medicine / Anesthesiology

## 2019-10-08 ENCOUNTER — OFFICE VISIT (OUTPATIENT)
Dept: GASTROENTEROLOGY | Facility: CLINIC | Age: 64
End: 2019-10-08
Payer: MEDICARE

## 2019-10-08 VITALS
RESPIRATION RATE: 18 BRPM | WEIGHT: 170.63 LBS | BODY MASS INDEX: 27.42 KG/M2 | HEART RATE: 67 BPM | SYSTOLIC BLOOD PRESSURE: 116 MMHG | DIASTOLIC BLOOD PRESSURE: 71 MMHG | HEIGHT: 66 IN

## 2019-10-08 DIAGNOSIS — R19.5 POSITIVE COLORECTAL CANCER SCREENING USING COLOGUARD TEST: Primary | ICD-10-CM

## 2019-10-08 PROCEDURE — 99204 PR OFFICE/OUTPT VISIT, NEW, LEVL IV, 45-59 MIN: ICD-10-PCS | Mod: S$GLB,,, | Performed by: NURSE PRACTITIONER

## 2019-10-08 PROCEDURE — 99204 OFFICE O/P NEW MOD 45 MIN: CPT | Mod: S$GLB,,, | Performed by: NURSE PRACTITIONER

## 2019-10-08 PROCEDURE — 3008F BODY MASS INDEX DOCD: CPT | Mod: CPTII,S$GLB,, | Performed by: NURSE PRACTITIONER

## 2019-10-08 PROCEDURE — 99999 PR PBB SHADOW E&M-EST. PATIENT-LVL IV: ICD-10-PCS | Mod: PBBFAC,,, | Performed by: NURSE PRACTITIONER

## 2019-10-08 PROCEDURE — 3008F PR BODY MASS INDEX (BMI) DOCUMENTED: ICD-10-PCS | Mod: CPTII,S$GLB,, | Performed by: NURSE PRACTITIONER

## 2019-10-08 PROCEDURE — 99999 PR PBB SHADOW E&M-EST. PATIENT-LVL IV: CPT | Mod: PBBFAC,,, | Performed by: NURSE PRACTITIONER

## 2019-10-08 RX ORDER — LURASIDONE HYDROCHLORIDE 40 MG/1
TABLET, FILM COATED ORAL
Refills: 0 | COMMUNITY
Start: 2019-09-30 | End: 2019-11-20

## 2019-10-08 NOTE — LETTER
October 8, 2019      Joshua Mackey MD  [id number] [address] [address]           [facility] [address] [place] [address]  Phone: [phone]          Patient: Rosenda Hunter   MR Number: [id number]   YOB: 1955   Date of Visit: 10/8/2019       Dear Dr. Joshua Mackey:    Thank you for referring Rosenda Hunter to me for evaluation. Attached you will find relevant portions of my assessment and plan of care.    If you have questions, please do not hesitate to call me. I look forward to following Rosenda Hunter along with you.    Sincerely,    Arminda Jeffrey, NP    Enclosure  CC:  No Recipients    If you would like to receive this communication electronically, please contact [email or web address] or [phone] to request more information on [employer] [facility] access.    For providers and/or their staff who would like to refer a patient to [facility], please contact us through our one-stop-shop provider referral line, [facility] , at [phone].    If you feel you have received this communication in error or would no longer like to receive these types of communications, please e-mail [email or web address]

## 2019-10-08 NOTE — PROGRESS NOTES
Subjective:       Patient ID: Rosenda Hunter is a 64 y.o. female, Body mass index is 27.54 kg/m².    Chief Complaint: Colonoscopy (consult) and Positive cologaurd test      Patient is new to me. Referred by Dr. Mackey for positive Cologuard.     Referred for positive Cologuard. Never had a colonoscopy. Feeling well, no complaints. Denies melena, hematochezia, abdominal pain, constipation, nausea, vomiting, dysphagia, or unexpected weight loss. Has diarrhea occ., depends on what she eats. Has heartburn occ., does not take anything for it.     Review of Systems   Constitutional: Negative for appetite change, fever and unexpected weight change.   HENT: Negative for trouble swallowing.    Respiratory: Positive for shortness of breath (chronic problem; instructed to follow up with PCP). Negative for cough.    Cardiovascular: Negative for chest pain.   Gastrointestinal: Positive for diarrhea (occ. bouts of diarrhea; denies currently). Negative for abdominal pain, blood in stool, constipation, nausea and vomiting.   Genitourinary: Negative for difficulty urinating and dysuria.   Musculoskeletal: Negative for gait problem.   Skin: Negative for rash.   Neurological: Negative for speech difficulty.   Psychiatric/Behavioral: Negative for confusion.       Past Medical History:   Diagnosis Date    Anxiety     Arthritis     Cellulitis of leg     COPD (chronic obstructive pulmonary disease)     Depression     Diabetes mellitus     as an adult, corrected with diet    DVT (deep venous thrombosis)     Fracture, rib     Hyperlipidemia     Hypertension     Hypothyroidism     Lumbar disc disease       Past Surgical History:   Procedure Laterality Date    DENTAL SURGERY      hien        Family History   Problem Relation Age of Onset    Diabetes Mother     Hypertension Mother     Hyperlipidemia Mother     Heart disease Mother     Kidney disease Mother     Breast cancer Paternal Aunt       Wt Readings from Last 10  Encounters:   10/08/19 77.4 kg (170 lb 10.2 oz)   09/26/19 78.4 kg (172 lb 12.8 oz)   09/12/19 75.8 kg (167 lb 3.5 oz)   05/22/19 71.9 kg (158 lb 8.2 oz)   05/13/19 71.9 kg (158 lb 8.2 oz)   04/30/19 71.9 kg (158 lb 9.6 oz)   04/02/19 73 kg (160 lb 15 oz)   04/02/19 73.6 kg (162 lb 4.1 oz)   03/25/19 71.2 kg (157 lb)   02/05/19 76.2 kg (168 lb)     Lab Results   Component Value Date    WBC 4.96 09/06/2018    HGB 14.1 09/06/2018    HCT 44.2 09/06/2018    MCV 99 (H) 09/06/2018     09/06/2018     CMP  Sodium   Date Value Ref Range Status   09/06/2018 138 136 - 145 mmol/L Final     Potassium   Date Value Ref Range Status   09/06/2018 4.2 3.5 - 5.1 mmol/L Final     Chloride   Date Value Ref Range Status   09/06/2018 103 95 - 110 mmol/L Final     CO2   Date Value Ref Range Status   09/06/2018 30 22 - 31 mmol/L Final     Glucose   Date Value Ref Range Status   09/06/2018 79 70 - 110 mg/dL Final     Comment:     The ADA recommends the following guidelines for fasting glucose:  Normal:       less than 100 mg/dL  Prediabetes:  100 mg/dL to 125 mg/dL  Diabetes:     126 mg/dL or higher       BUN, Bld   Date Value Ref Range Status   09/06/2018 14 7 - 18 mg/dL Final     Creatinine   Date Value Ref Range Status   09/06/2018 0.72 0.50 - 1.40 mg/dL Final     Calcium   Date Value Ref Range Status   09/06/2018 9.4 8.4 - 10.2 mg/dL Final     Total Protein   Date Value Ref Range Status   09/06/2018 6.4 6.0 - 8.4 g/dL Final     Albumin   Date Value Ref Range Status   09/06/2018 3.8 3.5 - 5.2 g/dL Final     Total Bilirubin   Date Value Ref Range Status   09/06/2018 0.3 0.2 - 1.3 mg/dL Final     Alkaline Phosphatase   Date Value Ref Range Status   09/06/2018 81 38 - 145 U/L Final     AST   Date Value Ref Range Status   09/06/2018 15 14 - 36 U/L Final     ALT   Date Value Ref Range Status   09/06/2018 23 10 - 44 U/L Final     Anion Gap   Date Value Ref Range Status   09/06/2018 5 (L) 8 - 16 mmol/L Final     eGFR if     Date Value Ref Range Status   09/06/2018 >60 >60 mL/min/1.73 m^2 Final     eGFR if non    Date Value Ref Range Status   09/06/2018 >60 >60 mL/min/1.73 m^2 Final     Comment:     Calculation used to obtain the estimated glomerular filtration  rate (eGFR) is the CKD-EPI equation.                  Reviewed prior medical records including endoscopy history (see surgical history).     Objective:      Physical Exam   Constitutional: She is oriented to person, place, and time. She appears well-developed and well-nourished.   HENT:   Head: Normocephalic.   Eyes: Pupils are equal, round, and reactive to light.   Neck: Normal range of motion.   Cardiovascular: Normal rate, regular rhythm and normal heart sounds.   No murmur heard.  Pulmonary/Chest: No respiratory distress. She has decreased breath sounds. She has no wheezes.   Abdominal: Soft. Bowel sounds are normal. She exhibits no distension. There is no tenderness.   Musculoskeletal: Normal range of motion.   Neurological: She is alert and oriented to person, place, and time.   Skin: Skin is warm and dry. Abrasion (to arms and legs) noted. No rash noted.   Non jaundiced   Psychiatric: She has a normal mood and affect. Her speech is normal.         Assessment:       1. Positive colorectal cancer screening using Cologuard test           Plan:   All diagnoses and orders for this visit:    Positive colorectal cancer screening using Cologuard test   - CBC   - Schedule Colonoscopy, discussed procedure with the patient, including risks and benefits, patient verbalized understanding    If no improvement in symptoms or symptoms worsen, call/follow-up at clinic or go to ER

## 2019-10-08 NOTE — PATIENT INSTRUCTIONS
Fecal Occult Blood Test  Does this test have other names?  FOBT, stool occult blood  What is this test?  A fecal occult blood test checks a stool sample for blood that can't be seen with the naked eye. Blood in the stool is a sign of bleeding in the digestive tract. This could indicate cancer, polyps, hemorrhoids, diverticulosis, or inflammatory bowel disease, also called colitis.   Why do I need this test?  The American Cancer Society recommends that all men and women at average risk for colon cancer start screening tests at age 50. One screening test option is a fecal occult blood test every year. This simple test can help find colon or rectal cancer. Your healthcare provider may give you the test. Or you can buy a home use kit.   What other tests might I have along with this test?  If the fecal occult blood test shows blood in the stool, you will need a colonoscopy to find out the source and nature of the bleeding.   What do my test results mean?  Many things may affect your lab test results. These include the method each lab uses to do the test. Even if your test results are different from the normal value, you may not have a problem. To learn what the results mean for you, talk with your healthcare provider.  A fecal occult blood test uses chemicals to find out if there is blood in a sample of feces. If your test is negative, your result is normal.  If your test is positive, you had blood from your digestive tract in your stool sample. Additional testing, such as a colonoscopy, can help find out the location, cause, and extent of the bleeding.   How is this test done?  Your healthcare provider gives you a kit to help you collect and prepare bowel movement samples for the fecal occult blood test. The test requires collecting samples from more than one bowel movement - typically, 3 in a row. You then mail the samples to a lab or returning them to your healthcare provider's office.  Collecting and preparing the  samples typically follows these steps:  1. Collect one of your stools in a dry container. Don't allow urine to mix with it.  2. Use a wooden applicator to put a small smear of stool on the card or slide you have been given.  3. Flush the unused stool down the toilet.  4. Seal the sample and write your name and date on it.  5. Repeat this process for the next two stools, or as instructed.   Does this test pose any risks?  This test poses no known risks.  What might affect my test results?  A positive result on a fecal occult blood test doesn't mean you have cancer. Other health conditions, such as ulcers or hemorrhoids, more commonly can cause a positive test result. Healthcare providers will do more tests find the cause.  Eating certain foods can affect the test results, even though the fecal occult blood test only detects human blood. Your healthcare provider may ask you to avoid certain foods a few days before the test to lower the chances of getting a false-positive result.   How do I get ready for this test?  Do not take nonsteroidal anti-inflammatory drugs, such as ibuprofen or naproxen, or aspirin for 7 days before the test. Acetaminophen is safe to use. If you take aspirin daily to prevent heart disease, talk with your healthcare provider before you stop taking this medicine.  Do not take vitamin C supplements or drink or eat juice or fruits high in vitamin C for 7 days before the test. Vitamin C can cause a false-negative test result.  Don't eat red meat, such as beef, lamb, pork, and liver, for 3 days before testing. Although it's unlikely, these foods could alter your test result. In some cases, healthcare providers don't give any restrictions because this may make patients less likely to do the test. Having the test is the most important thing.      © 0334-3075 [employer] [address] [place] [address], [place] [address]. All rights reserved. This information is not intended as a substitute for  professional medical care. Always follow your healthcare professional's instructions.

## 2019-10-10 ENCOUNTER — TELEPHONE (OUTPATIENT)
Dept: PAIN MEDICINE | Facility: CLINIC | Age: 64
End: 2019-10-10

## 2019-10-10 NOTE — TELEPHONE ENCOUNTER
----- Message from Johanna Higginbotham sent at 10/10/2019  1:17 PM CDT -----  Contact: patient  Type: Needs Medical Advice    Who Called:  patient  Best Call Back Number: [phone] (home)   Additional Information:Patient would like  Call back to speak to a nurse about the pain in her neck its shooting from side to side and it feels like a knot in her neck needs advice

## 2019-10-11 ENCOUNTER — OFFICE VISIT (OUTPATIENT)
Dept: PAIN MEDICINE | Facility: CLINIC | Age: 64
End: 2019-10-11
Payer: MEDICARE

## 2019-10-11 ENCOUNTER — HOSPITAL ENCOUNTER (OUTPATIENT)
Dept: RADIOLOGY | Facility: HOSPITAL | Age: 64
Discharge: HOME | End: 2019-10-11
Attending: ANESTHESIOLOGY
Payer: MEDICARE

## 2019-10-11 ENCOUNTER — TELEPHONE (OUTPATIENT)
Dept: GASTROENTEROLOGY | Facility: CLINIC | Age: 64
End: 2019-10-11

## 2019-10-11 VITALS
TEMPERATURE: 96 F | DIASTOLIC BLOOD PRESSURE: 72 MMHG | WEIGHT: 172.5 LBS | SYSTOLIC BLOOD PRESSURE: 109 MMHG | OXYGEN SATURATION: 97 % | BODY MASS INDEX: 27.84 KG/M2 | HEART RATE: 78 BPM | RESPIRATION RATE: 20 BRPM

## 2019-10-11 DIAGNOSIS — M54.2 NECK PAIN: ICD-10-CM

## 2019-10-11 DIAGNOSIS — M79.10 MYALGIA: ICD-10-CM

## 2019-10-11 DIAGNOSIS — M54.12 CERVICAL RADICULITIS: ICD-10-CM

## 2019-10-11 DIAGNOSIS — M54.12 CERVICAL RADICULITIS: Primary | ICD-10-CM

## 2019-10-11 PROCEDURE — 99999 PR PBB SHADOW E&M-EST. PATIENT-LVL III: ICD-10-PCS | Mod: PBBFAC,,, | Performed by: ANESTHESIOLOGY

## 2019-10-11 PROCEDURE — 99213 OFFICE O/P EST LOW 20 MIN: CPT | Mod: S$GLB,,, | Performed by: ANESTHESIOLOGY

## 2019-10-11 PROCEDURE — 72052 X-RAY EXAM NECK SPINE 6/>VWS: CPT | Mod: TC,PO

## 2019-10-11 PROCEDURE — 99213 PR OFFICE/OUTPT VISIT, EST, LEVL III, 20-29 MIN: ICD-10-PCS | Mod: S$GLB,,, | Performed by: ANESTHESIOLOGY

## 2019-10-11 PROCEDURE — 3008F PR BODY MASS INDEX (BMI) DOCUMENTED: ICD-10-PCS | Mod: CPTII,S$GLB,, | Performed by: ANESTHESIOLOGY

## 2019-10-11 PROCEDURE — 99999 PR PBB SHADOW E&M-EST. PATIENT-LVL III: CPT | Mod: PBBFAC,,, | Performed by: ANESTHESIOLOGY

## 2019-10-11 PROCEDURE — 3008F BODY MASS INDEX DOCD: CPT | Mod: CPTII,S$GLB,, | Performed by: ANESTHESIOLOGY

## 2019-10-11 PROCEDURE — 72052 XR CERVICAL SPINE 5 VIEW WITH FLEX AND EXT: ICD-10-PCS | Mod: 26,,, | Performed by: RADIOLOGY

## 2019-10-11 PROCEDURE — 72052 X-RAY EXAM NECK SPINE 6/>VWS: CPT | Mod: 26,,, | Performed by: RADIOLOGY

## 2019-10-11 RX ORDER — SODIUM CHLORIDE, SODIUM LACTATE, POTASSIUM CHLORIDE, CALCIUM CHLORIDE 600; 310; 30; 20 MG/100ML; MG/100ML; MG/100ML; MG/100ML
INJECTION, SOLUTION INTRAVENOUS CONTINUOUS
Status: CANCELLED | OUTPATIENT
Start: 2019-10-23

## 2019-10-11 RX ORDER — HYDROCODONE BITARTRATE AND ACETAMINOPHEN 5; 325 MG/1; MG/1
1 TABLET ORAL EVERY 12 HOURS PRN
Qty: 14 TABLET | Refills: 0 | Status: SHIPPED | OUTPATIENT
Start: 2019-10-11 | End: 2019-11-20

## 2019-10-11 NOTE — PROGRESS NOTES
[facility] Follow Up Evaluation    Referred by: Dr. Garcia  Reason for referral: neck pain    CC:   Chief Complaint   Patient presents with    Neck Pain    Results     MRI      Last 3 PDI Scores 10/11/2019 9/12/2019 4/2/2019   Pain Disability Index (PDI) 16 16 5     Interval HPI 10/11/19: Ms. Hunter returns to the office for follow up.  Over the past week her neck pain has worsened, today it's 8/10, constant with some radiation into the right shoulder. No numbness, no weakness, no bowel/bladder dysfunction.  She continues lyrica with mild relief.  She also continues tizanidine with mild-mod relief.  She has completed PT which had previously worked well until this recent worsening.      Interval HPI 9/12/19: Ms. Hunter returns to the office for follow up.  She continues to have some neck pain into her b/l shoulders, 3/10. No numbness, no weakness.  She says she may have exacerbated it as her mother dropped a mattress on her by accident last week.  She continues lyrica, heat, and ice with mild relief.     Interval HPI 5/13/19: Ms. Hunter returns to the office for follow up.  Has done PT, when she first started it worked really well. Improved ROM with neck flexion and extension. PT 2 weeks ago she felt like it made her worse.  Today her pain is 5/10, constant, aching in her b/l neck. She reports pain is only in her neck.  She continues to have excoriations and lesions over her arms, neck, and back.  She reports fleas at home may be contributing.      HPI:   Rosenda Hunter is a 64 y.o. female who complains of neck pain    Onset: a couple months  Progression: since onset, pain is stable  Current Pain Score: 8/10  Typical Range: 6-8/10  Timing: constant  Quality: Aching and Dull  Radiation: no  Associated numbness or weakness: yes numbness, tingling right hand  Exacerbated by: nothing in particular  Allievated by: nothing  Is Pain Level Acceptable?: No    Previous  Therapies:  PT/OT: yes in the past for the lumbar spine  HEP:   Interventions:   Previous:  11/21/17 -  Left L4-5, L5-S1 TFESI  Surgery:  Medications:   - NSAIDS:   - MSK Relaxants:   - TCAs:   - SNRIs:   - Topicals:   - Anticonvulsants:  - Opioids: hydrocodone     Current Pain Medications:  1. Lyrica 75mg BID    History:    Current Outpatient Medications:     albuterol (ACCUNEB) 1.25 mg/3 mL Nebu, USE 1 AMPULE PER NEBULIZER IN THE MORNING, AT NOON, AND EVENING AS NEEDED, Disp: 75 mL, Rfl: 5    b complex vitamins capsule, Take 1 capsule by mouth once daily., Disp: , Rfl:     COMBIVENT RESPIMAT  mcg/actuation inhaler, INHALE 1 PUFF 3 TIMES DAILY SHAKE WELL RINSE MOUTH AFTER USING, Disp: 4 g, Rfl: 5    diazePAM (VALIUM) 5 MG tablet, TAKE 1 TABLET BY MOUTH ONCE DAILY AT BEDTIME AS NEEDED FOR SLEEP, Disp: 15 tablet, Rfl: 2    FLUoxetine (PROZAC) 20 MG capsule, Take 1 capsule by mouth every night, Disp: , Rfl: 1    FLUoxetine (PROZAC) 40 MG capsule, Take 1 capsule by mouth every night take with 20mg dose for total of 60mg, Disp: , Rfl: 3    HYDROcodone-acetaminophen (NORCO) 5-325 mg per tablet, Take 1 tablet by mouth every 12 (twelve) hours as needed for Pain., Disp: 14 tablet, Rfl: 0    LATUDA 40 mg Tab tablet, Take one (1) tablet by mouth at bedtime, Disp: , Rfl: 0    levothyroxine (SYNTHROID) 50 MCG tablet, TAKE 1 TABLET BY MOUTH DAILY. TAKE ON EMPTY STOMACH, Disp: 90 tablet, Rfl: 3    pregabalin (LYRICA) 75 MG capsule, Take 1 capsule (75 mg total) by mouth 3 (three) times daily., Disp: 90 capsule, Rfl: 4    Past Medical History:   Diagnosis Date    Anxiety     Arthritis     Cellulitis of leg     COPD (chronic obstructive pulmonary disease)     Depression     Diabetes mellitus     as an adult, corrected with diet    DVT (deep venous thrombosis)     Fracture, rib     Hyperlipidemia     Hypertension     Hypothyroidism     Lumbar disc disease        Past Surgical History:   Procedure  Laterality Date    DENTAL SURGERY      hien         Family History   Problem Relation Age of Onset    Diabetes Mother     Hypertension Mother     Hyperlipidemia Mother     Heart disease Mother     Kidney disease Mother     Colon polyps Father     Breast cancer Paternal Aunt     Colon cancer Neg Hx     Stomach cancer Neg Hx     Esophageal cancer Neg Hx        Social History     Socioeconomic History    Marital status:      Spouse name: Not on file    Number of children: Not on file    Years of education: Not on file    Highest education level: Not on file   Occupational History    Not on file   Social Needs    Financial resource strain: Not on file    Food insecurity:     Worry: Not on file     Inability: Not on file    Transportation needs:     Medical: Not on file     Non-medical: Not on file   Tobacco Use    Smoking status: Current Every Day Smoker     Packs/day: 0.50    Smokeless tobacco: Never Used   Substance and Sexual Activity    Alcohol use: No    Drug use: No    Sexual activity: Yes     Partners: Male   Lifestyle    Physical activity:     Days per week: Not on file     Minutes per session: Not on file    Stress: Not on file   Relationships    Social connections:     Talks on phone: Not on file     Gets together: Not on file     Attends Quaker service: Not on file     Active member of club or organization: Not on file     Attends meetings of clubs or organizations: Not on file     Relationship status: Not on file   Other Topics Concern    Are you pregnant or think you may be? Not Asked    Breast-feeding Not Asked   Social History Narrative    Not on file       Review of patient's allergies indicates:   Allergen Reactions    Ceclor [cefaclor] Swelling    Aspirin        Review of Systems:  There have been no changes since the patient was last seen by me in the office on 9/12/19 except for - fever/chills    Physical Exam:  Vitals:    10/11/19 1121   BP: 109/72   Pulse:  78   Resp: 20   Temp: 96.1 °F (35.6 °C)   TempSrc: Oral   SpO2: 97%   Weight: 78.3 kg (172 lb 8.2 oz)   PainSc:   8   PainLoc: Neck     Body mass index is 27.84 kg/m².     Gen: NAD  Psych: mood appropriate for given condition  CV: RRR  HEENT: anicteric   Respiratory: non labored  Abd: soft nt, nd  Skin: intact  Sensation: intact to lt touch bilaterally in c4-t1   Reflexes: 2+ b/l Bicep, Beard negative  ROM: Cervical ROM full, shoulder, elbow and wrist ROM full, no scapular dysmotility   Tone:  Normal at elbow, wrist and shoulder   Inspection: no atrophy of bicep, FDI or APB noted  Palpation: tender cervical paraspinals, levator scapula and trapezius     Motor:    Right Left   C4 Shoulder Abduction  5  5   C5 Elbow Flexion    5  5   C6 Wrist Extension  5  5   C7 Elbow Extension   5  5   C8/T1 Hand Intrinsics   5  5   C8 First Dorsal Interosseus  5  5   C8 Abductor Pollicus Brevis  5  5       Imaging:  MRI cervical 2/19/19  FINDINGS:  There is loss of normal lordotic curvature with mild kyphosis centered at the C6 vertebral body level.  There is mild levo scoliotic curvature centered at the C5 vertebral body level.  There is 3 mm anterolisthesis of C4 on C5.  No other malalignment.  Mild degenerative endplate edema is present at C7-T1.  Otherwise, bone marrow signal throughout the cervical spine is within normal limits.  Craniocervical junction appears normal.  Unremarkable paraspinal soft tissues.    C2-C3: No central canal or foraminal narrowing.  C3-C4: 3 mm disc bulge with mild bilateral hypertrophic facet changes.  Mild to moderate central canal stenosis, worst at the right lateral recess.  Moderate to severe right neural foraminal narrowing.  Moderate left neural foraminal narrowing.  C4-C5: Minimal anterolisthesis of C4 and C5 with slight uncovering of the intervertebral disc.  There is a superimposed 1-2 mm broad-based disc bulge, asymmetrically larger at the left paramedian position.  Mild central canal  narrowing.  Mild bilateral neural foraminal narrowing.  C5-C6: No significant disc bulge.  Moderately sized left and small right uncovertebral osteophytes.  The left uncovertebral osteophyte results in moderate narrowing at the left lateral recess.  There is mild local mass effect on the subjacent cervical cord.  No abnormal cord signal.  No other central canal narrowing.  Mild to moderate right neural foraminal narrowing is present.  There is severe left neural foraminal narrowing.  C6-C7: Tiny right and moderately sized left uncovertebral osteophytes.  No significant disc bulge.  The left-sided uncovertebral osteophyte results in moderate central canal narrowing, worse at the left lateral recess.  There is mild local mass effect on the subjacent cord.  No abnormal cord signal.  Mild to moderate right neural foraminal narrowing.  Severe left neural foraminal narrowing.  C7-T1: 4 mm disc bulge.  Small bilateral uncovertebral osteophytes.  Mild central canal narrowing.  No right foraminal narrowing.  Mild left neural foraminal narrowing.    Labs:  BMP  Lab Results   Component Value Date     09/06/2018    K 4.2 09/06/2018     09/06/2018    CO2 30 09/06/2018    BUN 14 09/06/2018    CREATININE 0.72 09/06/2018    CALCIUM 9.4 09/06/2018    ANIONGAP 5 (L) 09/06/2018    ESTGFRAFRICA >60 09/06/2018    EGFRNONAA >60 09/06/2018     Lab Results   Component Value Date    ALT 23 09/06/2018    AST 15 09/06/2018    ALKPHOS 81 09/06/2018    BILITOT 0.3 09/06/2018       Assessment:  Problem List Items Addressed This Visit        Neuro    Cervical radiculitis - Primary    Relevant Orders    X-Ray Cervical Spine 5 View With Flex And Ext       Orthopedic    Neck pain    Myalgia          Treatment Plan:  64 y.o. year old female with PMH, COPD, DM II, hypothyroidism, presents to the office with neck pain.  Her neck pain has been bothering her for the past couple of months.  She denies any prior neck surgery or injections.   Today she c/o neck pain, 8/10, constant, dull and aching.  No radicular pain or radiculopathy.  She also has numbness intermittently in both of her hands that get worse when she crochets.  She has not tried any PT for her neck pain.  She takes motrin with mild relief.  Has previously take hydrocodone 5/325mg prn with mild-moderate relief for severe pain.  On exam 5/5 strength b/l UE's.  No signs of myelopathy.  + Phalen's b/l.  Referral given for physical therapy to improve function and strength, and to receive training towards establishing a safe and effective home exercise program (HEP).  She should try to wear carpal tunnel brace in the evenings.  If still continues to have numbness in her hands, can refer to orthopedics for evaluation.  She should continue NSAIDs and I will provide with short course of hydrocodone for prn use for severe pain.  I also discussed this should not be taken with valium, and educated her on the risks of using benzo's and opioids such as respiratory depression, oversedation, and even death.  She understands and will not take the medications at the same time.  In the future I advised her that she can refill as needed with her primary care doctor as she has previously done.  If her axial neck pain fails to improve with conservative treatment, discussed that I can consider further interventions.  However it should be noted that she has multiple open lesions over her neck and shoulders, and I discussed that I am unwilling to do any intervention until they are completely healed.  Follow up in the office in 8 weeks following PT.     5/13/19 - Ms. Hunter returns to the office for follow up.  Has done PT, when she first started it worked really well. Improved ROM with neck flexion and extension. PT 2 weeks ago she felt like it made her worse.  Today her pain is 5/10, constant, aching in her b/l neck. She reports pain is only in her neck.  She continues to have excoriations and lesions  over her arms, neck, and back.  She reports fleas at home may be contributing.  At this time I recommend that she continue PT and continue tizanidine as prescribed.   I can do TPI, however she needs to see derm first as the lesion on her neck and back are still present and I am concerned about infection.  Referral to derm has been placed.  She can follow up in 2-3 months after continued conservative care and derm consultation.     9/12/19 - Ms. Hunter returns to the office for follow up.  She continues to have some neck pain into her b/l shoulders, 3/10.  No numbness, no weakness.  She says she may have exacerbated it as her mother dropped a mattress on her by accident last week.  No bowel/bladder dysfunction, no gait instability.  She continues lyrica, heat, and ice with mild relief.  She stopped taking tizanidine as she said she would not be able to urinate when taking that medication.  She has no problems voiding when not taking that medication.  She reports recent low grade fever and chills that started a day or two ago.  She has also started medrol dose pack yesterday.  At this time I don't recommend any interventions given her fever and recent steroid intake.  She also says her pain is manageable with HEP and heat/ice.  She will follow up if her pain worsens and gets to the point where it interferes with her mobility and ADL's and we can consider cervical CELI.    10/11/19 - Ms. Hunter returns to the office for follow up.  Over the past week her neck pain has worsened, today it's 8/10, constant with some radiation into the right shoulder. No numbness, no weakness, no bowel/bladder dysfunction.  She continues lyrica with mild relief.  She also continues tizanidine with mild-mod relief.  She has completed PT which had previously worked well until this recent worsening.  We will get cervical xray with flex/ex today to r/o any instability.  MRI cervical spine with C3-4 moderate to severe right neural  foraminal narrowing. Her pain is limiting her mobility and interfering with her ADL's.  We will schedule cervical CELI.  She also likely has component of axial neck pain from cervical spondylosis, but will see if CELI address her pain first before attempting facet injections.  Follow up 2 weeks post injection.      Procedures: cervical CELI. Procedure explained using an anatomical model.  Risks, benefits, alternatives explained to patient who verbalized understanding, including increased risk of infection, bleeding, need for additional procedures or surgery, and nerve damage.  Questions regarding the procedure, risks, expected outcome, and possible side effects were solicited and answered to the patient's satisfaction.  Rosenda wishes to proceed with the injection.  Verbal and written consent were obtained in clinic today.  PT/OT/HEP: continue HEP  Medications: continue current medications as prescribed, short course of hydrocodone 5/325mg po bid prn for severe pain while we set up injection  Labs: Reviewed and medications are appropriately dosed for current hepatorenal function.  Imaging: cervical xray with flex/ex to r/o instability    : Reviewed and consistent with medication use as prescribed.    Yasmany Sotomayor M.D.  Interventional Pain Medicine / Anesthesiology

## 2019-10-11 NOTE — TELEPHONE ENCOUNTER
----- Message from Nancy Wilks sent at 10/11/2019 12:32 PM CDT -----  Contact: self [phone]   Pt was to reschedule her colonocospy to November,, Wednesday or Friday...... Please call. Not on nov 6 or 23rd . Thanks

## 2019-10-14 ENCOUNTER — TELEPHONE (OUTPATIENT)
Dept: GASTROENTEROLOGY | Facility: CLINIC | Age: 64
End: 2019-10-14

## 2019-10-14 NOTE — TELEPHONE ENCOUNTER
R/s colonoscopy for 11/21, mailed confirmation and instructions to address on file. Pt instructed to wait until the week before the procedure to  her Rx. Pt verbalized understanding.

## 2019-10-18 ENCOUNTER — TELEPHONE (OUTPATIENT)
Dept: PAIN MEDICINE | Facility: CLINIC | Age: 64
End: 2019-10-18

## 2019-10-18 NOTE — TELEPHONE ENCOUNTER
----- Message from Lauren Martinez sent at 10/18/2019  1:06 PM CDT -----  Contact: pt  Type:  Test Results    Who Called:  Pt  Name of Test (Lab/Mammo/Etc):  xrays of neck  Date of Test:  10/11/2019  Ordering Provider:  Светлана  Where the test was performed:  [facility] Call Back Number:  [phone] or [phone]  Additional Information:  Please Advise ---Thank you

## 2019-10-21 ENCOUNTER — TELEPHONE (OUTPATIENT)
Dept: PAIN MEDICINE | Facility: CLINIC | Age: 64
End: 2019-10-21

## 2019-10-21 NOTE — TELEPHONE ENCOUNTER
----- Message from Domingo Thacker sent at 10/21/2019  8:29 AM CDT -----  Type:  Patient Returning Call    Who Called:  Patient   Who Left Message for Patient:  Patient   Does the patient know what this is regarding?:  Procedure  Best Call Back Number:  [phone]  Additional Information: Patient group therapy before 9am or  after 2pm, it is very imperative that she speak with someone in the clinic regarding procedure.    Thanks,   Domingo Thacker  Patient Access Rep,   [phone]

## 2019-10-21 NOTE — TELEPHONE ENCOUNTER
----- Message from Jeanie Muhammad sent at 10/21/2019  1:10 PM CDT -----  Type: Needs Medical Advice    Who Called:  self  Symptoms (please be specific):  Has a procedure for Wednesday   How long has patient had these symptoms:  Asking to cancel / her pain is down to 0   Pharmacy name and phone #:     Best Call Back Number: [phone]  Additional Information: asking to discuss x-ray results

## 2019-10-21 NOTE — TELEPHONE ENCOUNTER
Hi.  There is arthritis in your neck.  There is also narrowing around your nerves likely causing your pain.  Nothing that we didn't already know.  There is not instability of the neck which is why we got the xray.

## 2019-10-21 NOTE — TELEPHONE ENCOUNTER
Patient is reporting that she slept over the weekend and that her pain is gone. Reviewed xray results, patient bought something to help with her posture and this made her neck pain worse. She has stopped using this and now her pain is gone. She will call back if the pain returns and reschedule procedure.

## 2019-11-18 ENCOUNTER — TELEPHONE (OUTPATIENT)
Dept: GASTROENTEROLOGY | Facility: CLINIC | Age: 64
End: 2019-11-18

## 2019-11-18 NOTE — TELEPHONE ENCOUNTER
----- Message from Romaine Escobedo sent at 11/18/2019  9:51 AM CST -----  Contact: Ptnt  [phone]  Type: Needs Medical Advice    Who Called: Ptnt  [phone]    Symptoms (please be specific):  Not feeling well needs to cancel her colonoscopy on 11-21-19.    Additional Information: Advised needs to cancel her procedure on 11-21-19. Will call in later to reschedule.

## 2019-11-20 ENCOUNTER — OFFICE VISIT (OUTPATIENT)
Dept: FAMILY MEDICINE | Facility: CLINIC | Age: 64
End: 2019-11-20
Payer: MEDICARE

## 2019-11-20 VITALS
HEIGHT: 66 IN | SYSTOLIC BLOOD PRESSURE: 110 MMHG | OXYGEN SATURATION: 96 % | BODY MASS INDEX: 26.77 KG/M2 | HEART RATE: 76 BPM | DIASTOLIC BLOOD PRESSURE: 68 MMHG | WEIGHT: 166.56 LBS

## 2019-11-20 DIAGNOSIS — F17.200 SMOKING ADDICTION: ICD-10-CM

## 2019-11-20 DIAGNOSIS — F20.0 PARANOID SCHIZOPHRENIA: Primary | ICD-10-CM

## 2019-11-20 DIAGNOSIS — F32.A ANXIETY AND DEPRESSION: ICD-10-CM

## 2019-11-20 DIAGNOSIS — M54.12 CERVICAL RADICULITIS: ICD-10-CM

## 2019-11-20 DIAGNOSIS — E03.9 ACQUIRED HYPOTHYROIDISM: ICD-10-CM

## 2019-11-20 DIAGNOSIS — J44.9 CHRONIC OBSTRUCTIVE PULMONARY DISEASE, UNSPECIFIED COPD TYPE: ICD-10-CM

## 2019-11-20 DIAGNOSIS — R73.03 PREDIABETES: ICD-10-CM

## 2019-11-20 DIAGNOSIS — F41.9 ANXIETY AND DEPRESSION: ICD-10-CM

## 2019-11-20 DIAGNOSIS — E78.2 MIXED HYPERLIPIDEMIA: ICD-10-CM

## 2019-11-20 PROCEDURE — 99204 OFFICE O/P NEW MOD 45 MIN: CPT | Mod: S$GLB,,, | Performed by: FAMILY MEDICINE

## 2019-11-20 PROCEDURE — 99999 PR PBB SHADOW E&M-EST. PATIENT-LVL III: CPT | Mod: PBBFAC,,, | Performed by: FAMILY MEDICINE

## 2019-11-20 PROCEDURE — 99999 PR PBB SHADOW E&M-EST. PATIENT-LVL III: ICD-10-PCS | Mod: PBBFAC,,, | Performed by: FAMILY MEDICINE

## 2019-11-20 PROCEDURE — 99499 UNLISTED E&M SERVICE: CPT | Mod: S$GLB,,, | Performed by: FAMILY MEDICINE

## 2019-11-20 PROCEDURE — 99204 PR OFFICE/OUTPT VISIT, NEW, LEVL IV, 45-59 MIN: ICD-10-PCS | Mod: S$GLB,,, | Performed by: FAMILY MEDICINE

## 2019-11-20 PROCEDURE — 99499 RISK ADDL DX/OHS AUDIT: ICD-10-PCS | Mod: S$GLB,,, | Performed by: FAMILY MEDICINE

## 2019-11-20 PROCEDURE — 3008F PR BODY MASS INDEX (BMI) DOCUMENTED: ICD-10-PCS | Mod: CPTII,S$GLB,, | Performed by: FAMILY MEDICINE

## 2019-11-20 PROCEDURE — 3008F BODY MASS INDEX DOCD: CPT | Mod: CPTII,S$GLB,, | Performed by: FAMILY MEDICINE

## 2019-11-20 NOTE — PROGRESS NOTES
"Subjective:       Patient ID: Rosenda Hunter is a 64 y.o. female.    Chief Complaint: Establish Care    This pt is new to me and to this clinic.  The pt has been followed by psychiatry in the past for paranoid schizophrenia.  She is not currently seeing a mental health provider--she stopped her Latuda because it made her very sleepy.  She continues on Prozac 60 mg a day.  The pt is also followed for hypothyroidism, COPD, prediabetes, neuropathy of legs and cervical disc disease--she sees Dr. Sotomayor.    Review of Systems   Constitutional: Negative for activity change, appetite change, fatigue and unexpected weight change.   Eyes: Negative for visual disturbance.   Respiratory: Negative for cough, chest tightness and shortness of breath.    Cardiovascular: Negative for chest pain, palpitations and leg swelling.   Gastrointestinal: Negative for abdominal pain, constipation, diarrhea, nausea and vomiting.   Endocrine: Negative for cold intolerance, heat intolerance and polyuria.   Genitourinary: Negative for decreased urine volume and dysuria.   Musculoskeletal: Positive for back pain and neck pain. Negative for arthralgias.   Skin: Negative for rash.   Neurological: Positive for numbness. Negative for headaches.   Psychiatric/Behavioral: Negative for dysphoric mood and suicidal ideas. The patient is nervous/anxious.        Objective:       Vitals:    11/20/19 1446   BP: 110/68   BP Location: Left arm   Patient Position: Sitting   BP Method: Medium (Manual)   Pulse: 76   SpO2: 96%   Weight: 75.5 kg (166 lb 8.6 oz)   Height: 5' 6" (1.676 m)     Physical Exam   Constitutional: She is oriented to person, place, and time. She appears well-developed and well-nourished. No distress.   HENT:   Head: Normocephalic.   Right Ear: External ear normal.   Left Ear: External ear normal.   Nose: Nose normal.   Mouth/Throat: Oropharynx is clear and moist.   Eyes: Pupils are equal, round, and reactive to light. Conjunctivae and EOM " "are normal. No scleral icterus.   Neck: Normal range of motion. Neck supple. No thyromegaly present.   Cardiovascular: Normal rate, regular rhythm and normal heart sounds.   No murmur heard.  Pulmonary/Chest: Effort normal and breath sounds normal. She has no wheezes. She has no rales.   Abdominal: Soft. Bowel sounds are normal. She exhibits no distension. There is no tenderness.   Musculoskeletal: Normal range of motion. She exhibits tenderness (tender over post neck with spasm).   Lymphadenopathy:     She has no cervical adenopathy.   Neurological: She is alert and oriented to person, place, and time. No cranial nerve deficit or sensory deficit.   Skin: Skin is warm and dry. No rash noted.   Psychiatric: She has a normal mood and affect.   Nursing note and vitals reviewed.      Assessment:       Vitals:    11/20/19 1446   BP: 110/68   BP Location: Left arm   Patient Position: Sitting   BP Method: Medium (Manual)   Pulse: 76   SpO2: 96%   Weight: 75.5 kg (166 lb 8.6 oz)   Height: 5' 6" (1.676 m)     1. Paranoid schizophrenia    2. Acquired hypothyroidism    3. Mixed hyperlipidemia    4. Chronic obstructive pulmonary disease, unspecified COPD type    5. Anxiety and depression    6. Cervical radiculitis    7. Smoking addiction    8. Prediabetes        Plan:       Rosenda was seen today for establish care.    Diagnoses and all orders for this visit:    Paranoid schizophrenia    Acquired hypothyroidism    Mixed hyperlipidemia    Chronic obstructive pulmonary disease, unspecified COPD type    Anxiety and depression    Cervical radiculitis    Smoking addiction    Prediabetes  -     Ambulatory consult to Optometry      During this visit, I reviewed the pt's history, medications, allergies, and problem list.    Will have Lyubov Starr with [facility] see pt regarding mental health care  "

## 2019-11-21 ENCOUNTER — TELEPHONE (OUTPATIENT)
Dept: FAMILY MEDICINE | Facility: CLINIC | Age: 64
End: 2019-11-21

## 2019-11-21 DIAGNOSIS — F32.A ANXIETY AND DEPRESSION: ICD-10-CM

## 2019-11-21 DIAGNOSIS — F41.9 ANXIETY AND DEPRESSION: ICD-10-CM

## 2019-11-21 DIAGNOSIS — F20.0 PARANOID SCHIZOPHRENIA: Primary | ICD-10-CM

## 2019-11-21 NOTE — TELEPHONE ENCOUNTER
----- Message from Gurinder Amaya, Patient Care Assistant sent at 11/21/2019  4:40 PM CST -----  Contact: self  Pt needs a referral to [facility] mental health    [phone]    Pt is about to run out of meds    Please advise [phone] or [phone]

## 2019-11-25 ENCOUNTER — TELEPHONE (OUTPATIENT)
Dept: FAMILY MEDICINE | Facility: CLINIC | Age: 64
End: 2019-11-25

## 2019-11-25 NOTE — TELEPHONE ENCOUNTER
Spoke with Morenita with [facility] she states that referral for psychiatry has to be faxed to [facility] will fax referral to [phone]

## 2019-11-25 NOTE — TELEPHONE ENCOUNTER
----- Message from Melinda Mata sent at 11/25/2019 12:33 PM CST -----  [facility]   Type:  Patient Returning Call    Who Called:  [facility]   Who Left Message for Patient: Janey   Does the patient know what this is regarding?:    Best Call Back Number:  [phone]   Additional Information: regarding request that was sent for  for phychiatric  therapy but need to contact [facility] instead at information below     Fax number to [facility] is [phone] (fax number) or number is [phone]    Pt has questions about coding, for her wellness and physical visits.   Contact phone number [phone]

## 2019-12-05 ENCOUNTER — TELEPHONE (OUTPATIENT)
Dept: FAMILY MEDICINE | Facility: CLINIC | Age: 64
End: 2019-12-05

## 2019-12-05 NOTE — TELEPHONE ENCOUNTER
----- Message from Terrell Flowers sent at 12/5/2019 11:25 AM CST -----  Contact: Patient  Type:  Patient Returning Call    Who Called:  Patient  Who Left Message for Patient:  unknown  Does the patient know what this is regarding?:  Unknown, no note/message, missed call only  Best Call Back Number:  [phone]  Additional Information: NA

## 2019-12-09 ENCOUNTER — TELEPHONE (OUTPATIENT)
Dept: FAMILY MEDICINE | Facility: CLINIC | Age: 64
End: 2019-12-09

## 2019-12-09 NOTE — TELEPHONE ENCOUNTER
Callback to patient--states lump on wrist is very paindul--refused appt today with [facility], appt scheduled for Friday with Dr Amaya

## 2019-12-09 NOTE — TELEPHONE ENCOUNTER
----- Message from Raina Santiago sent at 12/9/2019 10:03 AM CST -----  Contact: pt  ..Type: Needs Medical Advice    Who Called:  pt  Best Call Back Number: [phone]  Additional Information: Pt stated she have a painful lump on her inner right wrist, need to speak with a nurse to discuss or get advise on what to do  Please call to advise  Thanks

## 2020-01-03 DIAGNOSIS — E11.9 TYPE 2 DIABETES MELLITUS WITHOUT COMPLICATION: ICD-10-CM

## 2020-01-15 ENCOUNTER — OFFICE VISIT (OUTPATIENT)
Dept: FAMILY MEDICINE | Facility: CLINIC | Age: 65
End: 2020-01-15
Payer: MEDICARE

## 2020-01-15 VITALS
TEMPERATURE: 98 F | SYSTOLIC BLOOD PRESSURE: 100 MMHG | DIASTOLIC BLOOD PRESSURE: 60 MMHG | BODY MASS INDEX: 27.1 KG/M2 | HEIGHT: 66 IN | OXYGEN SATURATION: 98 % | WEIGHT: 168.63 LBS | HEART RATE: 89 BPM

## 2020-01-15 DIAGNOSIS — F20.0 PARANOID SCHIZOPHRENIA: ICD-10-CM

## 2020-01-15 DIAGNOSIS — L03.90 CELLULITIS, UNSPECIFIED CELLULITIS SITE: Primary | ICD-10-CM

## 2020-01-15 DIAGNOSIS — J44.9 CHRONIC OBSTRUCTIVE PULMONARY DISEASE, UNSPECIFIED COPD TYPE: ICD-10-CM

## 2020-01-15 PROBLEM — I20.89 ANGINAL EQUIVALENT: Status: ACTIVE | Noted: 2020-01-15

## 2020-01-15 PROCEDURE — 3008F BODY MASS INDEX DOCD: CPT | Mod: CPTII,S$GLB,, | Performed by: NURSE PRACTITIONER

## 2020-01-15 PROCEDURE — 99499 RISK ADDL DX/OHS AUDIT: ICD-10-PCS | Mod: S$GLB,,, | Performed by: NURSE PRACTITIONER

## 2020-01-15 PROCEDURE — 99214 OFFICE O/P EST MOD 30 MIN: CPT | Mod: S$GLB,,, | Performed by: NURSE PRACTITIONER

## 2020-01-15 PROCEDURE — 99999 PR PBB SHADOW E&M-EST. PATIENT-LVL IV: ICD-10-PCS | Mod: PBBFAC,,, | Performed by: NURSE PRACTITIONER

## 2020-01-15 PROCEDURE — 99999 PR PBB SHADOW E&M-EST. PATIENT-LVL IV: CPT | Mod: PBBFAC,,, | Performed by: NURSE PRACTITIONER

## 2020-01-15 PROCEDURE — 3008F PR BODY MASS INDEX (BMI) DOCUMENTED: ICD-10-PCS | Mod: CPTII,S$GLB,, | Performed by: NURSE PRACTITIONER

## 2020-01-15 PROCEDURE — 99499 UNLISTED E&M SERVICE: CPT | Mod: S$GLB,,, | Performed by: NURSE PRACTITIONER

## 2020-01-15 PROCEDURE — 99214 PR OFFICE/OUTPT VISIT, EST, LEVL IV, 30-39 MIN: ICD-10-PCS | Mod: S$GLB,,, | Performed by: NURSE PRACTITIONER

## 2020-01-15 RX ORDER — MUPIROCIN 20 MG/G
OINTMENT TOPICAL 3 TIMES DAILY
Qty: 15 G | Refills: 0 | Status: SHIPPED | OUTPATIENT
Start: 2020-01-15 | End: 2021-03-09

## 2020-01-15 RX ORDER — DOXYCYCLINE 100 MG/1
100 CAPSULE ORAL 2 TIMES DAILY
Qty: 20 CAPSULE | Refills: 0 | Status: SHIPPED | OUTPATIENT
Start: 2020-01-15 | End: 2020-01-25

## 2020-01-15 NOTE — PROGRESS NOTES
Subjective:       Patient ID: Rosenda Hunter is a 64 y.o. female.    Chief Complaint: Rash (noted before 1/25/2019 ..on buttock and back)    Patient who is new to me presents for rash. She admits to picking her skin on her arms. This is a recurrent skin issue.     Rash   This is a new (12/19/2019) problem. The current episode started 1 to 4 weeks ago. The problem has been gradually worsening since onset. The rash is diffuse. The rash is characterized by draining, scaling and redness. She was exposed to nothing. Pertinent negatives include no congestion, cough, diarrhea, eye pain, fatigue, fever, shortness of breath, sore throat or vomiting. Past treatments include nothing.     Review of Systems   Constitutional: Negative for chills, fatigue and fever.   HENT: Negative for congestion, sinus pressure, sinus pain, sneezing and sore throat.    Eyes: Negative for pain.   Respiratory: Negative for cough, chest tightness, shortness of breath and wheezing.    Cardiovascular: Negative for chest pain, palpitations and leg swelling.   Gastrointestinal: Negative for abdominal distention, abdominal pain, constipation, diarrhea, nausea and vomiting.   Genitourinary: Negative for decreased urine volume, difficulty urinating, dysuria, frequency and urgency.   Musculoskeletal: Positive for myalgias (chronic ) and neck pain (chronic). Negative for arthralgias, gait problem and joint swelling.   Skin: Positive for rash. Negative for wound.   Neurological: Negative for dizziness, light-headedness, numbness and headaches.       Objective:      Physical Exam   Constitutional: She is oriented to person, place, and time. She appears well-developed and well-nourished.   HENT:   Head: Normocephalic and atraumatic.   Right Ear: External ear normal.   Left Ear: External ear normal.   Nose: Nose normal.   Mouth/Throat: Oropharynx is clear and moist.   Eyes: Pupils are equal, round, and reactive to light.   Neck: Normal range of motion.    Cardiovascular: Normal rate, regular rhythm, normal heart sounds and intact distal pulses.   Pulmonary/Chest: Effort normal and breath sounds normal.   Abdominal: Soft. Bowel sounds are normal.   Musculoskeletal: Normal range of motion.        Cervical back: She exhibits pain.   Neurological: She is alert and oriented to person, place, and time.   Skin: Skin is warm and dry.        Nursing note and vitals reviewed.                  Assessment:       1. Cellulitis, unspecified cellulitis site    2. Paranoid schizophrenia    3. Chronic obstructive pulmonary disease, unspecified COPD type        Plan:       Rosenda was seen today for rash.    Diagnoses and all orders for this visit:    Cellulitis, unspecified cellulitis site  -     doxycycline (MONODOX) 100 MG capsule; Take 1 capsule (100 mg total) by mouth 2 (two) times daily. for 10 days  -     mupirocin (BACTROBAN) 2 % ointment; Apply topically 3 (three) times daily.    Paranoid schizophrenia  Patient states she has appointment with psychiatrist in march.     Chronic obstructive pulmonary disease, unspecified COPD type  Stable on current medications.    Advised to follow up in 3-4 days if no improvement. Discussed worsening signs/symptoms and when to return to clinic or go to ED.   Patient expresses understanding and agrees with treatment plan.

## 2020-01-27 ENCOUNTER — TELEPHONE (OUTPATIENT)
Dept: FAMILY MEDICINE | Facility: CLINIC | Age: 65
End: 2020-01-27

## 2020-01-27 NOTE — TELEPHONE ENCOUNTER
Attempted to call patient back. Phone kept ringing, unable to leave a message and a call back number

## 2020-01-27 NOTE — TELEPHONE ENCOUNTER
----- Message from Tosha Gilbert sent at 1/27/2020  8:41 AM CST -----  Contact: pt [phone]  Same Day appt pt called she is asking to see you today for Neck Pain and bumps and swollen tongue. Pt  Was seen in the ER at [facility].  Call back at [phone]

## 2020-03-09 LAB
CHOLEST SERPL-MSCNC: 208 MG/DL (ref 0–200)
HBA1C MFR BLD: 4.7 % (ref 4–6)
HDLC SERPL-MCNC: 51 MG/DL
LDLC SERPL CALC-MCNC: 127 MG/DL (ref 0–160)
TRIGL SERPL-MCNC: 152 MG/DL (ref 40–160)

## 2020-05-05 ENCOUNTER — PATIENT MESSAGE (OUTPATIENT)
Dept: ADMINISTRATIVE | Facility: HOSPITAL | Age: 65
End: 2020-05-05

## 2020-07-22 ENCOUNTER — PATIENT OUTREACH (OUTPATIENT)
Dept: ADMINISTRATIVE | Facility: HOSPITAL | Age: 65
End: 2020-07-22

## 2020-07-23 NOTE — PROGRESS NOTES
Chart review completed 2020.  Care Everywhere updates requested and reviewed.  Immunizations reconciled. Media reports reviewed.  Duplicate HM overrides and  orders removed.  Overdue HM topic chart audit and/or requested.  Overdue lab testing linked to upcoming lab appointments if applies.    Lab hiren, and quest reviewed   Pap Smear and Lab testing       DIS reviewed      Mammogram and DEXA      Updated DM labs from Care Everywhere      Health Maintenance Due   Topic Date Due    Hepatitis C Screening  1955    Foot Exam  1966    HIV Screening  1971    Aspirin/Antiplatelet Therapy  1974    Pneumococcal Vaccine (Medium Risk) (1 of 1 - PPSV23) 1975    Cervical Cancer Screening  1977    Urine Microalbumin  2018    Colorectal Cancer Screening  2018    Mammogram  2020

## 2020-09-22 ENCOUNTER — TELEPHONE (OUTPATIENT)
Dept: FAMILY MEDICINE | Facility: CLINIC | Age: 65
End: 2020-09-22

## 2020-09-22 NOTE — TELEPHONE ENCOUNTER
----- Message from Angeles Lal sent at 9/22/2020  1:06 PM CDT -----  Regarding: advice  Contact: Patient sister vargas alarcon  Patient sister vargas alarcon ph# [phone], stated she need to come in with the patient but she only can wear a face shield, and not a mask.  She bringing patient in today for an appointment due to the patient complains of her tongue is burning.  Patient sister stated she don't know if this is a physical problem or mental problem due to patient being paranoid schizophrenia.  Please call upon request

## 2020-09-22 NOTE — TELEPHONE ENCOUNTER
Patient is scheduled for tomorrow, but can only wear sheild and not a face mask (due to mental health issues)    Will she be able to be seen in clinic.

## 2020-09-24 DIAGNOSIS — E11.9 TYPE 2 DIABETES MELLITUS WITHOUT COMPLICATION: ICD-10-CM

## 2021-03-03 ENCOUNTER — PATIENT MESSAGE (OUTPATIENT)
Dept: FAMILY MEDICINE | Facility: CLINIC | Age: 66
End: 2021-03-03

## 2021-03-03 ENCOUNTER — PATIENT OUTREACH (OUTPATIENT)
Dept: ADMINISTRATIVE | Facility: HOSPITAL | Age: 66
End: 2021-03-03

## 2021-04-30 PROBLEM — K11.7 XEROSTOMIA: Status: ACTIVE | Noted: 2021-04-30

## 2021-06-13 ENCOUNTER — HOSPITAL ENCOUNTER (EMERGENCY)
Facility: HOSPITAL | Age: 66
Discharge: PSYCHIATRIC OR SUBSTANCE USE FACILITY | End: 2021-06-15
Attending: EMERGENCY MEDICINE
Payer: MEDICARE

## 2021-06-13 DIAGNOSIS — F20.0 PARANOID SCHIZOPHRENIA: Primary | ICD-10-CM

## 2021-06-13 LAB
ALBUMIN SERPL BCP-MCNC: 4 G/DL (ref 3.5–5.2)
ALP SERPL-CCNC: 95 U/L (ref 55–135)
ALT SERPL W/O P-5'-P-CCNC: 17 U/L (ref 10–44)
AMPHET+METHAMPHET UR QL: NEGATIVE
ANION GAP SERPL CALC-SCNC: 12 MMOL/L (ref 8–16)
APAP SERPL-MCNC: <3 UG/ML (ref 10–20)
AST SERPL-CCNC: 16 U/L (ref 10–40)
BARBITURATES UR QL SCN>200 NG/ML: NEGATIVE
BASOPHILS # BLD AUTO: 0.02 K/UL (ref 0–0.2)
BASOPHILS NFR BLD: 0.3 % (ref 0–1.9)
BENZODIAZ UR QL SCN>200 NG/ML: NEGATIVE
BILIRUB SERPL-MCNC: 0.4 MG/DL (ref 0.1–1)
BILIRUB UR QL STRIP: NEGATIVE
BUN SERPL-MCNC: 16 MG/DL (ref 8–23)
BZE UR QL SCN: NEGATIVE
CALCIUM SERPL-MCNC: 9.3 MG/DL (ref 8.7–10.5)
CANNABINOIDS UR QL SCN: NEGATIVE
CHLORIDE SERPL-SCNC: 102 MMOL/L (ref 95–110)
CLARITY UR: CLEAR
CO2 SERPL-SCNC: 24 MMOL/L (ref 23–29)
COLOR UR: YELLOW
CREAT SERPL-MCNC: 0.7 MG/DL (ref 0.5–1.4)
CREAT UR-MCNC: 87 MG/DL (ref 15–325)
DIFFERENTIAL METHOD: ABNORMAL
EOSINOPHIL # BLD AUTO: 0 K/UL (ref 0–0.5)
EOSINOPHIL NFR BLD: 0.4 % (ref 0–8)
ERYTHROCYTE [DISTWIDTH] IN BLOOD BY AUTOMATED COUNT: 12.4 % (ref 11.5–14.5)
EST. GFR  (AFRICAN AMERICAN): >60 ML/MIN/1.73 M^2
EST. GFR  (NON AFRICAN AMERICAN): >60 ML/MIN/1.73 M^2
ETHANOL SERPL-MCNC: <10 MG/DL
GLUCOSE SERPL-MCNC: 99 MG/DL (ref 70–110)
GLUCOSE UR QL STRIP: NEGATIVE
HCT VFR BLD AUTO: 38.6 % (ref 37–48.5)
HCV AB SERPL QL IA: NEGATIVE
HEP C VIRUS HOLD SPECIMEN: NORMAL
HGB BLD-MCNC: 12.8 G/DL (ref 12–16)
HGB UR QL STRIP: ABNORMAL
IMM GRANULOCYTES # BLD AUTO: 0.02 K/UL (ref 0–0.04)
IMM GRANULOCYTES NFR BLD AUTO: 0.3 % (ref 0–0.5)
KETONES UR QL STRIP: NEGATIVE
LEUKOCYTE ESTERASE UR QL STRIP: ABNORMAL
LYMPHOCYTES # BLD AUTO: 2 K/UL (ref 1–4.8)
LYMPHOCYTES NFR BLD: 29.5 % (ref 18–48)
MCH RBC QN AUTO: 31.1 PG (ref 27–31)
MCHC RBC AUTO-ENTMCNC: 33.2 G/DL (ref 32–36)
MCV RBC AUTO: 94 FL (ref 82–98)
METHADONE UR QL SCN>300 NG/ML: NEGATIVE
MICROSCOPIC COMMENT: NORMAL
MONOCYTES # BLD AUTO: 0.8 K/UL (ref 0.3–1)
MONOCYTES NFR BLD: 11.4 % (ref 4–15)
NEUTROPHILS # BLD AUTO: 4 K/UL (ref 1.8–7.7)
NEUTROPHILS NFR BLD: 58.1 % (ref 38–73)
NITRITE UR QL STRIP: NEGATIVE
NRBC BLD-RTO: 0 /100 WBC
OPIATES UR QL SCN: NEGATIVE
PCP UR QL SCN>25 NG/ML: NEGATIVE
PH UR STRIP: 6 [PH] (ref 5–8)
PLATELET # BLD AUTO: 280 K/UL (ref 150–450)
PMV BLD AUTO: 8.7 FL (ref 9.2–12.9)
POTASSIUM SERPL-SCNC: 3.7 MMOL/L (ref 3.5–5.1)
PROT SERPL-MCNC: 6.6 G/DL (ref 6–8.4)
PROT UR QL STRIP: NEGATIVE
RBC # BLD AUTO: 4.12 M/UL (ref 4–5.4)
RBC #/AREA URNS HPF: 2 /HPF (ref 0–4)
SARS-COV-2 RDRP RESP QL NAA+PROBE: NEGATIVE
SODIUM SERPL-SCNC: 138 MMOL/L (ref 136–145)
SP GR UR STRIP: 1.01 (ref 1–1.03)
TOXICOLOGY INFORMATION: NORMAL
TSH SERPL DL<=0.005 MIU/L-ACNC: 3.59 UIU/ML (ref 0.4–4)
URN SPEC COLLECT METH UR: ABNORMAL
UROBILINOGEN UR STRIP-ACNC: NEGATIVE EU/DL
WBC # BLD AUTO: 6.82 K/UL (ref 3.9–12.7)
WBC #/AREA URNS HPF: 1 /HPF (ref 0–5)

## 2021-06-13 PROCEDURE — 82533 TOTAL CORTISOL: CPT | Performed by: EMERGENCY MEDICINE

## 2021-06-13 PROCEDURE — 80307 DRUG TEST PRSMV CHEM ANLYZR: CPT | Performed by: EMERGENCY MEDICINE

## 2021-06-13 PROCEDURE — 80143 DRUG ASSAY ACETAMINOPHEN: CPT | Performed by: EMERGENCY MEDICINE

## 2021-06-13 PROCEDURE — G0425 PR INPT TELEHEALTH CONSULT 30M: ICD-10-PCS | Mod: 95,,, | Performed by: PSYCHIATRY & NEUROLOGY

## 2021-06-13 PROCEDURE — 85025 COMPLETE CBC W/AUTO DIFF WBC: CPT | Performed by: EMERGENCY MEDICINE

## 2021-06-13 PROCEDURE — 99285 EMERGENCY DEPT VISIT HI MDM: CPT | Mod: 25

## 2021-06-13 PROCEDURE — 84425 ASSAY OF VITAMIN B-1: CPT | Performed by: EMERGENCY MEDICINE

## 2021-06-13 PROCEDURE — 82077 ASSAY SPEC XCP UR&BREATH IA: CPT | Performed by: EMERGENCY MEDICINE

## 2021-06-13 PROCEDURE — 82607 VITAMIN B-12: CPT | Performed by: EMERGENCY MEDICINE

## 2021-06-13 PROCEDURE — 86803 HEPATITIS C AB TEST: CPT | Performed by: EMERGENCY MEDICINE

## 2021-06-13 PROCEDURE — 80053 COMPREHEN METABOLIC PANEL: CPT | Performed by: EMERGENCY MEDICINE

## 2021-06-13 PROCEDURE — U0002 COVID-19 LAB TEST NON-CDC: HCPCS | Performed by: EMERGENCY MEDICINE

## 2021-06-13 PROCEDURE — G0425 INPT/ED TELECONSULT30: HCPCS | Mod: 95,,, | Performed by: PSYCHIATRY & NEUROLOGY

## 2021-06-13 PROCEDURE — 81000 URINALYSIS NONAUTO W/SCOPE: CPT | Mod: 59 | Performed by: EMERGENCY MEDICINE

## 2021-06-13 PROCEDURE — 36415 COLL VENOUS BLD VENIPUNCTURE: CPT | Performed by: EMERGENCY MEDICINE

## 2021-06-13 PROCEDURE — 84443 ASSAY THYROID STIM HORMONE: CPT | Performed by: EMERGENCY MEDICINE

## 2021-06-13 RX ORDER — ATORVASTATIN CALCIUM 20 MG/1
20 TABLET, FILM COATED ORAL
COMMUNITY
Start: 2020-12-24 | End: 2021-06-14

## 2021-06-13 RX ORDER — LEVOTHYROXINE SODIUM 50 UG/1
50 TABLET ORAL
COMMUNITY
Start: 2020-12-24 | End: 2021-06-14

## 2021-06-13 RX ORDER — OLANZAPINE 5 MG/1
5 TABLET ORAL
COMMUNITY
Start: 2020-12-24 | End: 2021-06-14

## 2021-06-13 RX ORDER — ESCITALOPRAM OXALATE 10 MG/1
10 TABLET ORAL
COMMUNITY
Start: 2020-12-24 | End: 2021-06-14

## 2021-06-14 LAB
CORTIS SERPL-MCNC: 3 UG/DL
VIT B12 SERPL-MCNC: 333 PG/ML (ref 210–950)

## 2021-06-14 PROCEDURE — 25000003 PHARM REV CODE 250: Performed by: FAMILY MEDICINE

## 2021-06-14 PROCEDURE — 25000003 PHARM REV CODE 250: Performed by: EMERGENCY MEDICINE

## 2021-06-14 RX ORDER — QUETIAPINE FUMARATE 25 MG/1
50 TABLET, FILM COATED ORAL DAILY
Status: DISCONTINUED | OUTPATIENT
Start: 2021-06-14 | End: 2021-06-15 | Stop reason: HOSPADM

## 2021-06-14 RX ORDER — QUETIAPINE FUMARATE 25 MG/1
50 TABLET, FILM COATED ORAL ONCE
Status: COMPLETED | OUTPATIENT
Start: 2021-06-14 | End: 2021-06-14

## 2021-06-14 RX ORDER — GABAPENTIN 100 MG/1
100 CAPSULE ORAL 3 TIMES DAILY
COMMUNITY
End: 2021-08-16

## 2021-06-14 RX ORDER — ACETAMINOPHEN 500 MG
1000 TABLET ORAL
Status: DISCONTINUED | OUTPATIENT
Start: 2021-06-14 | End: 2021-06-15 | Stop reason: HOSPADM

## 2021-06-14 RX ORDER — QUETIAPINE FUMARATE 50 MG/1
50 TABLET, FILM COATED ORAL NIGHTLY
COMMUNITY
End: 2021-08-16

## 2021-06-14 RX ORDER — GABAPENTIN 100 MG/1
100 CAPSULE ORAL 3 TIMES DAILY
Status: DISCONTINUED | OUTPATIENT
Start: 2021-06-14 | End: 2021-06-15 | Stop reason: HOSPADM

## 2021-06-14 RX ORDER — GABAPENTIN 100 MG/1
100 CAPSULE ORAL ONCE
Status: COMPLETED | OUTPATIENT
Start: 2021-06-14 | End: 2021-06-14

## 2021-06-14 RX ADMIN — GABAPENTIN 100 MG: 100 CAPSULE ORAL at 11:06

## 2021-06-14 RX ADMIN — QUETIAPINE FUMARATE 50 MG: 25 TABLET ORAL at 07:06

## 2021-06-14 RX ADMIN — GABAPENTIN 100 MG: 100 CAPSULE ORAL at 07:06

## 2021-06-14 RX ADMIN — QUETIAPINE FUMARATE 50 MG: 25 TABLET ORAL at 11:06

## 2021-06-15 VITALS
DIASTOLIC BLOOD PRESSURE: 70 MMHG | RESPIRATION RATE: 18 BRPM | BODY MASS INDEX: 28.24 KG/M2 | SYSTOLIC BLOOD PRESSURE: 108 MMHG | WEIGHT: 174.94 LBS | OXYGEN SATURATION: 95 % | TEMPERATURE: 98 F | HEART RATE: 70 BPM

## 2021-06-18 LAB — VIT B1 BLD-MCNC: 60 UG/L (ref 38–122)

## 2021-07-01 ENCOUNTER — PATIENT MESSAGE (OUTPATIENT)
Dept: ADMINISTRATIVE | Facility: OTHER | Age: 66
End: 2021-07-01

## 2021-11-19 PROBLEM — E78.00 PURE HYPERCHOLESTEROLEMIA: Status: ACTIVE | Noted: 2020-03-06

## 2021-11-19 PROBLEM — T42.4X1A BENZODIAZEPINE OVERDOSE: Status: ACTIVE | Noted: 2021-07-28

## 2021-11-19 PROBLEM — E11.42 TYPE 2 DIABETES MELLITUS WITH DIABETIC POLYNEUROPATHY, WITHOUT LONG-TERM CURRENT USE OF INSULIN: Status: ACTIVE | Noted: 2021-11-19

## 2021-11-19 PROBLEM — G62.0 DRUG-INDUCED POLYNEUROPATHY: Status: ACTIVE | Noted: 2020-03-06

## (undated) DEVICE — GLOVE SURGICAL LATEX SZ 7

## (undated) DEVICE — SEE MEDLINE ITEM 152622

## (undated) DEVICE — NDL SPINAL SPINOCAN 22GX3.5

## (undated) DEVICE — APPLICATOR CHLORAPREP CLR 10.5

## (undated) DEVICE — TRAY NERVE BLOCK

## (undated) DEVICE — MARKER SKIN STND TIP BLUE BARR